# Patient Record
Sex: MALE | Race: WHITE | NOT HISPANIC OR LATINO
[De-identification: names, ages, dates, MRNs, and addresses within clinical notes are randomized per-mention and may not be internally consistent; named-entity substitution may affect disease eponyms.]

---

## 2018-07-29 ENCOUNTER — FORM ENCOUNTER (OUTPATIENT)
Age: 23
End: 2018-07-29

## 2018-07-30 ENCOUNTER — APPOINTMENT (OUTPATIENT)
Dept: ORTHOPEDIC SURGERY | Facility: CLINIC | Age: 23
End: 2018-07-30
Payer: COMMERCIAL

## 2018-07-30 ENCOUNTER — APPOINTMENT (OUTPATIENT)
Dept: ALLERGY | Facility: CLINIC | Age: 23
End: 2018-07-30
Payer: COMMERCIAL

## 2018-07-30 ENCOUNTER — OUTPATIENT (OUTPATIENT)
Dept: OUTPATIENT SERVICES | Facility: HOSPITAL | Age: 23
LOS: 1 days | End: 2018-07-30
Payer: COMMERCIAL

## 2018-07-30 ENCOUNTER — APPOINTMENT (OUTPATIENT)
Dept: MRI IMAGING | Facility: CLINIC | Age: 23
End: 2018-07-30
Payer: COMMERCIAL

## 2018-07-30 VITALS
WEIGHT: 230 LBS | HEART RATE: 109 BPM | DIASTOLIC BLOOD PRESSURE: 84 MMHG | BODY MASS INDEX: 32.2 KG/M2 | SYSTOLIC BLOOD PRESSURE: 146 MMHG | HEIGHT: 71 IN

## 2018-07-30 VITALS
SYSTOLIC BLOOD PRESSURE: 140 MMHG | HEIGHT: 69 IN | DIASTOLIC BLOOD PRESSURE: 80 MMHG | RESPIRATION RATE: 14 BRPM | BODY MASS INDEX: 34.07 KG/M2 | HEART RATE: 80 BPM | WEIGHT: 230 LBS

## 2018-07-30 DIAGNOSIS — Z80.1 FAMILY HISTORY OF MALIGNANT NEOPLASM OF TRACHEA, BRONCHUS AND LUNG: ICD-10-CM

## 2018-07-30 DIAGNOSIS — Z78.9 OTHER SPECIFIED HEALTH STATUS: ICD-10-CM

## 2018-07-30 DIAGNOSIS — M54.16 RADICULOPATHY, LUMBAR REGION: ICD-10-CM

## 2018-07-30 DIAGNOSIS — Z00.8 ENCOUNTER FOR OTHER GENERAL EXAMINATION: ICD-10-CM

## 2018-07-30 DIAGNOSIS — M54.5 LOW BACK PAIN: ICD-10-CM

## 2018-07-30 DIAGNOSIS — Z80.3 FAMILY HISTORY OF MALIGNANT NEOPLASM OF BREAST: ICD-10-CM

## 2018-07-30 PROCEDURE — 72100 X-RAY EXAM L-S SPINE 2/3 VWS: CPT

## 2018-07-30 PROCEDURE — 99204 OFFICE O/P NEW MOD 45 MIN: CPT

## 2018-07-30 PROCEDURE — 99204 OFFICE O/P NEW MOD 45 MIN: CPT | Mod: 25

## 2018-07-30 PROCEDURE — 72148 MRI LUMBAR SPINE W/O DYE: CPT

## 2018-07-30 PROCEDURE — 72148 MRI LUMBAR SPINE W/O DYE: CPT | Mod: 26

## 2018-07-30 PROCEDURE — 94060 EVALUATION OF WHEEZING: CPT

## 2018-07-30 RX ORDER — CETIRIZINE HCL 10 MG
TABLET ORAL
Refills: 0 | Status: ACTIVE | COMMUNITY

## 2018-07-30 RX ORDER — ALBUTEROL SULFATE 90 UG/1
AEROSOL, METERED RESPIRATORY (INHALATION)
Refills: 0 | Status: ACTIVE | COMMUNITY

## 2018-07-30 RX ORDER — FLUTICASONE PROPIONATE 50 MCG
50 SPRAY, SUSPENSION NASAL
Refills: 0 | Status: ACTIVE | COMMUNITY

## 2018-07-30 RX ORDER — FLUTICASONE FUROATE AND VILANTEROL TRIFENATATE 200; 25 UG/1; UG/1
200-25 POWDER RESPIRATORY (INHALATION)
Refills: 0 | Status: ACTIVE | COMMUNITY

## 2018-07-30 RX ORDER — NAPROXEN SODIUM 220 MG
TABLET ORAL
Refills: 0 | Status: ACTIVE | COMMUNITY

## 2018-08-07 ENCOUNTER — APPOINTMENT (OUTPATIENT)
Dept: ALLERGY | Facility: CLINIC | Age: 23
End: 2018-08-07
Payer: COMMERCIAL

## 2018-08-07 VITALS — WEIGHT: 230 LBS | BODY MASS INDEX: 34.07 KG/M2 | HEIGHT: 69 IN

## 2018-08-07 PROCEDURE — 95018 ALL TSTG PERQ&IQ DRUGS/BIOL: CPT

## 2018-08-07 PROCEDURE — 31231 NASAL ENDOSCOPY DX: CPT

## 2018-08-07 PROCEDURE — 95004 PERQ TESTS W/ALRGNC XTRCS: CPT

## 2018-08-07 PROCEDURE — 99213 OFFICE O/P EST LOW 20 MIN: CPT | Mod: 25

## 2018-08-08 ENCOUNTER — APPOINTMENT (OUTPATIENT)
Dept: ALLERGY | Facility: CLINIC | Age: 23
End: 2018-08-08
Payer: COMMERCIAL

## 2018-08-09 ENCOUNTER — TRANSCRIPTION ENCOUNTER (OUTPATIENT)
Age: 23
End: 2018-08-09

## 2018-08-10 ENCOUNTER — INPATIENT (INPATIENT)
Facility: HOSPITAL | Age: 23
LOS: 4 days | Discharge: HOME | DRG: 178 | End: 2018-08-15
Attending: INTERNAL MEDICINE | Admitting: INTERNAL MEDICINE
Payer: COMMERCIAL

## 2018-08-10 ENCOUNTER — APPOINTMENT (OUTPATIENT)
Dept: ORTHOPEDIC SURGERY | Facility: CLINIC | Age: 23
End: 2018-08-10

## 2018-08-10 VITALS
DIASTOLIC BLOOD PRESSURE: 80 MMHG | TEMPERATURE: 99 F | OXYGEN SATURATION: 95 % | SYSTOLIC BLOOD PRESSURE: 141 MMHG | HEART RATE: 94 BPM | RESPIRATION RATE: 20 BRPM

## 2018-08-10 DIAGNOSIS — J15.9 UNSPECIFIED BACTERIAL PNEUMONIA: ICD-10-CM

## 2018-08-10 DIAGNOSIS — R11.10 VOMITING, UNSPECIFIED: ICD-10-CM

## 2018-08-10 DIAGNOSIS — D50.9 IRON DEFICIENCY ANEMIA, UNSPECIFIED: ICD-10-CM

## 2018-08-10 DIAGNOSIS — J18.9 PNEUMONIA, UNSPECIFIED ORGANISM: ICD-10-CM

## 2018-08-10 LAB
ALBUMIN SERPL ELPH-MCNC: 4.5 G/DL — SIGNIFICANT CHANGE UP (ref 3.3–5)
ALP SERPL-CCNC: 63 U/L — SIGNIFICANT CHANGE UP (ref 40–120)
ALT FLD-CCNC: 20 U/L — SIGNIFICANT CHANGE UP (ref 10–45)
ANION GAP SERPL CALC-SCNC: 13 MMOL/L — SIGNIFICANT CHANGE UP (ref 5–17)
AST SERPL-CCNC: 15 U/L — SIGNIFICANT CHANGE UP (ref 10–40)
BASOPHILS # BLD AUTO: 0.1 K/UL — SIGNIFICANT CHANGE UP (ref 0–0.2)
BASOPHILS NFR BLD AUTO: 0.5 % — SIGNIFICANT CHANGE UP (ref 0–2)
BILIRUB SERPL-MCNC: 0.3 MG/DL — SIGNIFICANT CHANGE UP (ref 0.2–1.2)
BUN SERPL-MCNC: 10 MG/DL — SIGNIFICANT CHANGE UP (ref 7–23)
CALCIUM SERPL-MCNC: 9.4 MG/DL — SIGNIFICANT CHANGE UP (ref 8.4–10.5)
CHLORIDE SERPL-SCNC: 99 MMOL/L — SIGNIFICANT CHANGE UP (ref 96–108)
CO2 SERPL-SCNC: 25 MMOL/L — SIGNIFICANT CHANGE UP (ref 22–31)
CREAT SERPL-MCNC: 0.84 MG/DL — SIGNIFICANT CHANGE UP (ref 0.5–1.3)
EOSINOPHIL # BLD AUTO: 0.2 K/UL — SIGNIFICANT CHANGE UP (ref 0–0.5)
EOSINOPHIL NFR BLD AUTO: 1.7 % — SIGNIFICANT CHANGE UP (ref 0–6)
GAS PNL BLDV: SIGNIFICANT CHANGE UP
GLUCOSE SERPL-MCNC: 96 MG/DL — SIGNIFICANT CHANGE UP (ref 70–99)
HCT VFR BLD CALC: 33.8 % — LOW (ref 39–50)
HGB BLD-MCNC: 12.1 G/DL — LOW (ref 13–17)
LYMPHOCYTES # BLD AUTO: 2.9 K/UL — SIGNIFICANT CHANGE UP (ref 1–3.3)
LYMPHOCYTES # BLD AUTO: 25.2 % — SIGNIFICANT CHANGE UP (ref 13–44)
MCHC RBC-ENTMCNC: 27 PG — SIGNIFICANT CHANGE UP (ref 27–34)
MCHC RBC-ENTMCNC: 35.9 GM/DL — SIGNIFICANT CHANGE UP (ref 32–36)
MCV RBC AUTO: 75.2 FL — LOW (ref 80–100)
MONOCYTES # BLD AUTO: 0.8 K/UL — SIGNIFICANT CHANGE UP (ref 0–0.9)
MONOCYTES NFR BLD AUTO: 6.5 % — SIGNIFICANT CHANGE UP (ref 2–14)
NEUTROPHILS # BLD AUTO: 7.7 K/UL — HIGH (ref 1.8–7.4)
NEUTROPHILS NFR BLD AUTO: 66.1 % — SIGNIFICANT CHANGE UP (ref 43–77)
PLATELET # BLD AUTO: 518 K/UL — HIGH (ref 150–400)
POTASSIUM SERPL-MCNC: 3.5 MMOL/L — SIGNIFICANT CHANGE UP (ref 3.5–5.3)
POTASSIUM SERPL-SCNC: 3.5 MMOL/L — SIGNIFICANT CHANGE UP (ref 3.5–5.3)
PROT SERPL-MCNC: 8.6 G/DL — HIGH (ref 6–8.3)
RAPID RVP RESULT: SIGNIFICANT CHANGE UP
RBC # BLD: 4.49 M/UL — SIGNIFICANT CHANGE UP (ref 4.2–5.8)
RBC # FLD: 16.5 % — HIGH (ref 10.3–14.5)
SODIUM SERPL-SCNC: 137 MMOL/L — SIGNIFICANT CHANGE UP (ref 135–145)
WBC # BLD: 11.7 K/UL — HIGH (ref 3.8–10.5)
WBC # FLD AUTO: 11.7 K/UL — HIGH (ref 3.8–10.5)

## 2018-08-10 PROCEDURE — 71046 X-RAY EXAM CHEST 2 VIEWS: CPT | Mod: 26

## 2018-08-10 PROCEDURE — 99223 1ST HOSP IP/OBS HIGH 75: CPT

## 2018-08-10 PROCEDURE — 99285 EMERGENCY DEPT VISIT HI MDM: CPT

## 2018-08-10 PROCEDURE — 71250 CT THORAX DX C-: CPT | Mod: 26

## 2018-08-10 RX ORDER — LANOLIN ALCOHOL/MO/W.PET/CERES
3 CREAM (GRAM) TOPICAL ONCE
Qty: 0 | Refills: 0 | Status: COMPLETED | OUTPATIENT
Start: 2018-08-10 | End: 2018-08-10

## 2018-08-10 RX ORDER — IPRATROPIUM/ALBUTEROL SULFATE 18-103MCG
3 AEROSOL WITH ADAPTER (GRAM) INHALATION ONCE
Qty: 0 | Refills: 0 | Status: COMPLETED | OUTPATIENT
Start: 2018-08-10 | End: 2018-08-10

## 2018-08-10 RX ORDER — PANTOPRAZOLE SODIUM 20 MG/1
40 TABLET, DELAYED RELEASE ORAL ONCE
Qty: 0 | Refills: 0 | Status: COMPLETED | OUTPATIENT
Start: 2018-08-10 | End: 2018-08-11

## 2018-08-10 RX ORDER — SODIUM CHLORIDE 9 MG/ML
1000 INJECTION, SOLUTION INTRAVENOUS
Qty: 0 | Refills: 0 | Status: DISCONTINUED | OUTPATIENT
Start: 2018-08-10 | End: 2018-08-12

## 2018-08-10 RX ORDER — CEFTRIAXONE 500 MG/1
1 INJECTION, POWDER, FOR SOLUTION INTRAMUSCULAR; INTRAVENOUS EVERY 24 HOURS
Qty: 0 | Refills: 0 | Status: DISCONTINUED | OUTPATIENT
Start: 2018-08-11 | End: 2018-08-15

## 2018-08-10 RX ORDER — IPRATROPIUM/ALBUTEROL SULFATE 18-103MCG
3 AEROSOL WITH ADAPTER (GRAM) INHALATION EVERY 6 HOURS
Qty: 0 | Refills: 0 | Status: DISCONTINUED | OUTPATIENT
Start: 2018-08-10 | End: 2018-08-12

## 2018-08-10 RX ORDER — CEFTRIAXONE 500 MG/1
1 INJECTION, POWDER, FOR SOLUTION INTRAMUSCULAR; INTRAVENOUS ONCE
Qty: 0 | Refills: 0 | Status: COMPLETED | OUTPATIENT
Start: 2018-08-10 | End: 2018-08-10

## 2018-08-10 RX ORDER — ENOXAPARIN SODIUM 100 MG/ML
40 INJECTION SUBCUTANEOUS EVERY 24 HOURS
Qty: 0 | Refills: 0 | Status: DISCONTINUED | OUTPATIENT
Start: 2018-08-10 | End: 2018-08-15

## 2018-08-10 RX ORDER — ACETAMINOPHEN 500 MG
500 TABLET ORAL EVERY 8 HOURS
Qty: 0 | Refills: 0 | Status: DISCONTINUED | OUTPATIENT
Start: 2018-08-10 | End: 2018-08-15

## 2018-08-10 RX ORDER — METOCLOPRAMIDE HCL 10 MG
10 TABLET ORAL ONCE
Qty: 0 | Refills: 0 | Status: COMPLETED | OUTPATIENT
Start: 2018-08-10 | End: 2018-08-10

## 2018-08-10 RX ORDER — FAMOTIDINE 10 MG/ML
20 INJECTION INTRAVENOUS ONCE
Qty: 0 | Refills: 0 | Status: COMPLETED | OUTPATIENT
Start: 2018-08-10 | End: 2018-08-10

## 2018-08-10 RX ORDER — ONDANSETRON 8 MG/1
4 TABLET, FILM COATED ORAL ONCE
Qty: 0 | Refills: 0 | Status: COMPLETED | OUTPATIENT
Start: 2018-08-10 | End: 2018-08-10

## 2018-08-10 RX ORDER — ALBUTEROL 90 UG/1
2 AEROSOL, METERED ORAL
Qty: 0 | Refills: 0 | COMMUNITY

## 2018-08-10 RX ORDER — PANTOPRAZOLE SODIUM 20 MG/1
40 TABLET, DELAYED RELEASE ORAL
Qty: 0 | Refills: 0 | Status: DISCONTINUED | OUTPATIENT
Start: 2018-08-10 | End: 2018-08-10

## 2018-08-10 RX ORDER — PANTOPRAZOLE SODIUM 20 MG/1
40 TABLET, DELAYED RELEASE ORAL
Qty: 0 | Refills: 0 | Status: DISCONTINUED | OUTPATIENT
Start: 2018-08-10 | End: 2018-08-11

## 2018-08-10 RX ORDER — AZITHROMYCIN 500 MG/1
500 TABLET, FILM COATED ORAL EVERY 24 HOURS
Qty: 0 | Refills: 0 | Status: DISCONTINUED | OUTPATIENT
Start: 2018-08-11 | End: 2018-08-15

## 2018-08-10 RX ORDER — SODIUM CHLORIDE 9 MG/ML
1000 INJECTION INTRAMUSCULAR; INTRAVENOUS; SUBCUTANEOUS ONCE
Qty: 0 | Refills: 0 | Status: COMPLETED | OUTPATIENT
Start: 2018-08-10 | End: 2018-08-10

## 2018-08-10 RX ORDER — ONDANSETRON 8 MG/1
4 TABLET, FILM COATED ORAL EVERY 6 HOURS
Qty: 0 | Refills: 0 | Status: DISCONTINUED | OUTPATIENT
Start: 2018-08-10 | End: 2018-08-15

## 2018-08-10 RX ORDER — AZITHROMYCIN 500 MG/1
500 TABLET, FILM COATED ORAL ONCE
Qty: 0 | Refills: 0 | Status: COMPLETED | OUTPATIENT
Start: 2018-08-10 | End: 2018-08-10

## 2018-08-10 RX ADMIN — Medication 10 MILLIGRAM(S): at 19:20

## 2018-08-10 RX ADMIN — ONDANSETRON 4 MILLIGRAM(S): 8 TABLET, FILM COATED ORAL at 12:16

## 2018-08-10 RX ADMIN — ONDANSETRON 4 MILLIGRAM(S): 8 TABLET, FILM COATED ORAL at 21:56

## 2018-08-10 RX ADMIN — Medication 30 MILLILITER(S): at 12:16

## 2018-08-10 RX ADMIN — SODIUM CHLORIDE 1000 MILLILITER(S): 9 INJECTION INTRAMUSCULAR; INTRAVENOUS; SUBCUTANEOUS at 12:16

## 2018-08-10 RX ADMIN — SODIUM CHLORIDE 150 MILLILITER(S): 9 INJECTION, SOLUTION INTRAVENOUS at 19:21

## 2018-08-10 RX ADMIN — Medication 3 MILLIGRAM(S): at 23:19

## 2018-08-10 RX ADMIN — AZITHROMYCIN 250 MILLIGRAM(S): 500 TABLET, FILM COATED ORAL at 15:42

## 2018-08-10 RX ADMIN — Medication 3 MILLILITER(S): at 18:16

## 2018-08-10 RX ADMIN — CEFTRIAXONE 100 GRAM(S): 500 INJECTION, POWDER, FOR SOLUTION INTRAMUSCULAR; INTRAVENOUS at 12:16

## 2018-08-10 RX ADMIN — ONDANSETRON 4 MILLIGRAM(S): 8 TABLET, FILM COATED ORAL at 16:43

## 2018-08-10 RX ADMIN — FAMOTIDINE 20 MILLIGRAM(S): 10 INJECTION INTRAVENOUS at 12:16

## 2018-08-10 RX ADMIN — Medication 10 MILLIGRAM(S): at 13:00

## 2018-08-10 RX ADMIN — Medication 3 MILLILITER(S): at 12:16

## 2018-08-10 NOTE — CONSULT NOTE ADULT - SUBJECTIVE AND OBJECTIVE BOX
Patient is a 23y old  Male who presents with a chief complaint of cough, shortness of breath, nausea, emesis, fevers    HPI:  23M with no known PMH, some chronic low back pain for which he had been taking alleve for several months with development of diarrhea, nausea. Currently awaiting work up for possible gastritis with EGD. Now with fevers (up to 101 at home) and productive cough with green sputum production for 1 week. Denies sick contacts, however did travel by plane to attend a music festival recently. Was seen by PMD and prescribed levaquin for suspected PNA. Pt reports taking levaquin for 3 days on Tue/Wed/Thurs but developed worsening nausea and emesis and stopped. This morning pt reported increased SOB with wheeze and cough. Presents to ER for evaluation.     Allergies: No Known Allergies      MEDICATIONS  (STANDING):  ALBUTerol/ipratropium for Nebulization 3 milliLiter(s) Nebulizer every 6 hours  enoxaparin Injectable 40 milliGRAM(s) SubCutaneous every 24 hours  lactated ringers. 1000 milliLiter(s) (150 mL/Hr) IV Continuous <Continuous>  pantoprazole    Tablet 40 milliGRAM(s) Oral before breakfast    MEDICATIONS  (PRN):  ondansetron Injectable 4 milliGRAM(s) IV Push every 6 hours PRN Nausea and/or Vomiting      PAST MEDICAL & SURGICAL HISTORY:  No pertinent past medical history  No significant past surgical history      FAMILY HISTORY:  no contributory     SOCIAL HISTORY:  non smoker  no alcohol intake due to GI upset      REVIEW OF SYSTEMS:    CONSTITUTIONAL: + fevers, no sweats, no myalgias or arthralgias   EYES: No eye pain, no visual disturbances  ENMT:  No difficulty hearing, no tinnitus, denies sinus pain, denies throat pain  NECK: No pain or stiffness  RESPIRATORY: + productive cough with green sputum , + SOB + wheeze, no hemoptysis   CARDIOVASCULAR: No chest pain, no palpitations  GASTROINTESTINAL: + intermittent diarrhea, + nausea + non bloody emesis  GENITOURINARY: No dysuria, no frequency  NEUROLOGICAL: No headaches, no loss of strength, no numbness  SKIN: No itching, burning, rashes, or lesions   ENDOCRINE: No heat or cold intolerance  MUSCULOSKELETAL: No joint pain or swelling; chronic  back pain        ICU Vital Signs Last 24 Hrs  T(C): 37.4 (10 Aug 2018 11:30), Max: 37.4 (10 Aug 2018 10:36)  T(F): 99.3 (10 Aug 2018 11:30), Max: 99.3 (10 Aug 2018 10:36)  HR: 86 (10 Aug 2018 16:17) (86 - 94)  BP: 112/71 (10 Aug 2018 16:17) (112/71 - 141/80)  RR: 18 (10 Aug 2018 16:17) (18 - 20)  SpO2: 96% (10 Aug 2018 16:17) (95% - 97%)      PHYSICAL EXAM:    GENERAL: NAD, well-groomed, well-developed  HEAD:  Atraumatic, Normocephalic  EYES: EOMI, PERRLA, conjunctiva and sclera clear  ENMT: No tonsillar erythema, no exudates; Moist mucous membranes  NECK: Supple, No JVD, Normal thyroid  NERVOUS SYSTEM:  Alert & Oriented X3, Good concentration; Motor Strength 5/5 B/L upper and lower extremities  CHEST/LUNG: bilateral air entry, no rales, scattered rhonchi left lung field along with intermittent scattered wheeze  HEART: Regular rate and rhythm; No murmurs  ABDOMEN: Soft, Nontender, Nondistended; Bowel sounds present  EXTREMITIES:  2+ Peripheral Pulses, No clubbing, cyanosis, or edema  SKIN: mild erythema noted around anterior and posterior neck and along right lateral neck, no complaints of pruritis     LABS:  CBC Full  -  ( 10 Aug 2018 12:04 )  WBC Count : 11.7 K/uL  Hemoglobin : 12.1 g/dL  Hematocrit : 33.8 %  Platelet Count - Automated : 518 K/uL  Mean Cell Volume : 75.2 fl  Mean Cell Hemoglobin : 27.0 pg  Mean Cell Hemoglobin Concentration : 35.9 gm/dL  Auto Neutrophil # : 7.7 K/uL  Auto Lymphocyte # : 2.9 K/uL  Auto Monocyte # : 0.8 K/uL  Auto Eosinophil # : 0.2 K/uL  Auto Basophil # : 0.1 K/uL  Auto Neutrophil % : 66.1 %  Auto Lymphocyte % : 25.2 %  Auto Monocyte % : 6.5 %  Auto Eosinophil % : 1.7 %  Auto Basophil % : 0.5 %    08-10    137  |  99  |  10  ----------------------------<  96  3.5   |  25  |  0.84    Ca    9.4      10 Aug 2018 12:04    TPro  8.6<H>  /  Alb  4.5  /  TBili  0.3  / AST  15  /  ALT  20  /  AlkPhos  63  08-10      RADIOLOGY:  CXR < from: Xray Chest 2 Views PA/Lat (08.10.18 @ 13:32) >  The cardiomediastinal silhouette is unremarkable.  The right lung is clear.  Scattered, focal atelectatic changes and increased interstitial markings   seen in mid and lower left lung of uncertain etiology.    CT chest < from: CT Chest No Cont (08.10.18 @ 14:22) >  Ground glass and tree-in-bud opacity of left upper lobe compatible with   pneumonia.

## 2018-08-10 NOTE — ED PROVIDER NOTE - MEDICAL DECISION MAKING DETAILS
22yo male with pneumonia not responding to Levaquin, failure to tolerate PO with benign abdomen, will tx with ceftriaxone/azithromycin to cover community-acquired pneumonia, Gi cocktail/zofran, IV fluids, admission. Lilliam Head DO 22yo male with pneumonia not responding to Levaquin, failure to tolerate PO with benign abdomen, will tx with ceftriaxone/azithromycin to cover community-acquired pneumonia, Gi cocktail/zofran, IV fluids, admission. Lilliam Tom MD: Pt is a 22 y/o male with no sig PMH who is sent in for admission for failed outpt tx of PNA. Pt p/w cough, fever tmax 101F, shortness of breath x 1 week. Diagnosed with pneumonia 3 days ago on CXR and started on Levaquin but unable to tolerate medication due to n/v. Patient went to OSH yesterday because he was having wheezing and given duoneb treatment. Patient saw PMD today and told to go to ED for admission and IV antibiotics. Plan: CXR, basic labs, bcx, IV abx, antiemetics, admission

## 2018-08-10 NOTE — H&P ADULT - PROBLEM SELECTOR PLAN 1
- monitor fever curve, Tylenol PRN if febrile  - f/u BCx sent by ED; order sputum Cx, RVP, Legionella antigen  - continue IV Abx w/ Ceftriaxone and Azithromycin with plan for conversion to PO if tolerating; ID recommendations appreciated: if patient remains afebrile, symptomatically improved, consider d/c as soon as tomorrow- can give AM doses of CTX and Azithro with Ceftin 500 mg PO BID to start on 8/12 for an additional 2 days  - SOB resolved; will continue PRN Duonebs; pulmonology recommendations appreciated - monitor fever curve, Tylenol PRN if febrile  - f/u BCx sent by ED; order sputum Cx, RVP, Legionella antigen  - continue IV Abx w/ Ceftriaxone and Azithromycin with plan for conversion to PO if tolerating; ID recommendations appreciated: if patient remains afebrile, symptomatically improved, consider d/c as soon as tomorrow- can give AM doses of CTX and Azithro with Ceftin 500 mg PO BID to start on 8/12 for an additional 2 days  - SOB resolved; will continue PRN Duonebs; pulmonology recommendations appreciated  - cardiology recommendations appreciated

## 2018-08-10 NOTE — H&P ADULT - NEGATIVE NEUROLOGICAL SYMPTOMS
no confusion/no syncope/no headache/no loss of consciousness/no transient paralysis/no vertigo/no loss of sensation/no difficulty walking

## 2018-08-10 NOTE — ED ADULT NURSE REASSESSMENT NOTE - NS ED NURSE REASSESS COMMENT FT1
Received report from ED RN Carol; patient A&Ox3, reports nausea- medicated with reglan as per MD order; VSS, 20 g IV in R AC patent and site WNL. Patient admitted and waiting for bed assignment. Family at the bedside.

## 2018-08-10 NOTE — CONSULT NOTE ADULT - SUBJECTIVE AND OBJECTIVE BOX
CARDIOLOGY CONSULT - Dr. Mathew     CHIEF COMPLAINT: pneumonia     HPI: 23 year old male with no significant PMHx presenting with complaints of worsening pneumonia. As per patient symptoms started last friday with fever, chills, and cough. He was diagnosed with pneumonia on Tuesday and prescribed levoquin. The symptoms gradually got worse with decreased appetite, vomiting, increased difficulty breathing and overall weak feelings. Pt. also states he has been having diarrhea, nausea intermittently since January Pt. was scheduled to get outpt. endoscopy today to r/o gastritis. Pt. currently resting comfortably, denies cp/sob/cook, denies orthopnea, lower extremity edema.       PAST MEDICAL & SURGICAL HISTORY:  No pertinent past medical history  No significant past surgical history          PREVIOUS DIAGNOSTIC TESTING:    [ ] Echocardiogram:   [ ]  Catheterization:   [ ] Stress Test:  	    MEDICATIONS:  MEDICATIONS  (STANDING):  azithromycin  IVPB 500 milliGRAM(s) IV Intermittent Once  LORazepam   Injectable 1 milliGRAM(s) IV Push Once      FAMILY HISTORY:      SOCIAL HISTORY:    [x] Non-smoker  [ ] Smoker  [ ] Alcohol    Allergies    No Known Allergies    Intolerances    	    REVIEW OF SYSTEMS:  CONSTITUTIONAL: No fever, weight loss,+ fatigue  EYES: No eye pain, visual disturbances, or discharge  ENMT:  No difficulty hearing, tinnitus, vertigo; No sinus or throat pain  NECK: No pain or stiffness  RESPIRATORY: +cough, wheezing, chills no hemoptysis; +Shortness of Breath  CARDIOVASCULAR: No chest pain, palpitations, passing out, dizziness, or leg swelling  GASTROINTESTINAL: No abdominal or epigastric pain. +nausea, vomiting, no hematemesis; +diarrhea no constipation. No melena or hematochezia  GENITOURINARY: No dysuria, frequency, hematuria, or incontinence  NEUROLOGICAL: No headaches, memory loss, loss of strength, numbness, or tremors  SKIN: No itching, burning, rashes, or lesions   	    [x] All others negative	  [ ] Unable to obtain    PHYSICAL EXAM:  T(C): 37.4 (08-10-18 @ 11:30), Max: 37.4 (08-10-18 @ 10:36)  HR: 92 (08-10-18 @ 11:30) (92 - 94)  BP: 137/89 (08-10-18 @ 11:30) (137/89 - 141/80)  RR: 20 (08-10-18 @ 11:30) (20 - 20)  SpO2: 97% (08-10-18 @ 11:30) (95% - 97%)  Wt(kg): --  I&O's Summary      Appearance: Normal	  Psychiatry: A & O x 3, Mood & affect appropriate  HEENT:   Normal oral mucosa, PERRL, EOMI	  Lymphatic: No lymphadenopathy  Cardiovascular: Normal S1 S2,RRR, No JVD, No murmurs  Respiratory: +exp wheeze   Gastrointestinal:  Soft, Non-tender, + BS	  Skin: No rashes, No ecchymoses, No cyanosis	  Neurologic: Non-focal  Extremities: Normal range of motion, No clubbing, cyanosis or edema  Vascular: Peripheral pulses palpable 2+ bilaterally    TELEMETRY: 	    ECG:  	  RADIOLOGY:   < from: Xray Chest 2 Views PA/Lat (08.10.18 @ 13:32) >  IMPRESSION:    1. Right lung is clear.  2. Left lung changes, as described above, which may be secondary to mild   infective process - although no focal left-sided infiltrate is seen.  This report sent to ER via PACs system.    < end of copied text >  OTHER: 	  	  LABS:	 	    CARDIAC MARKERS:                                  12.1   11.7  )-----------( 518      ( 10 Aug 2018 12:04 )             33.8     08-10    137  |  99  |  10  ----------------------------<  96  3.5   |  25  |  0.84    Ca    9.4      10 Aug 2018 12:04    TPro  8.6<H>  /  Alb  4.5  /  TBili  0.3  /  DBili  x   /  AST  15  /  ALT  20  /  AlkPhos  63  08-10      proBNP:   Lipid Profile:   HgA1c:   TSH: CARDIOLOGY CONSULT - Dr. Mathew     CHIEF COMPLAINT: pneumonia     HPI: 23 year old male with no significant PMHx presenting with complaints of worsening pneumonia. As per patient symptoms started last friday with fever, chills, and cough. He was diagnosed with pneumonia on Tuesday and prescribed levoquin. The symptoms gradually got worse with decreased appetite, vomiting, increased difficulty breathing and overall weak feelings. Pt. also states he has been having diarrhea, nausea intermittently since January Pt. was scheduled to get outpt. endoscopy today to r/o gastritis. Pt. currently resting comfortably, denies cp/sob/cook, denies orthopnea, lower extremity edema.       PAST MEDICAL & SURGICAL HISTORY:  No pertinent past medical history  No significant past surgical history          PREVIOUS DIAGNOSTIC TESTING:    [ ] Echocardiogram:   [ ]  Catheterization:   [ ] Stress Test:  	    MEDICATIONS:  MEDICATIONS  (STANDING):  azithromycin  IVPB 500 milliGRAM(s) IV Intermittent Once  LORazepam   Injectable 1 milliGRAM(s) IV Push Once      FAMILY HISTORY:      SOCIAL HISTORY:    [x] Non-smoker  [ ] Smoker  [ ] Alcohol    Allergies    No Known Allergies    Intolerances    	    REVIEW OF SYSTEMS:  CONSTITUTIONAL: No fever, weight loss,+ fatigue  EYES: No eye pain, visual disturbances, or discharge  ENMT:  No difficulty hearing, tinnitus, vertigo; No sinus or throat pain  NECK: No pain or stiffness  RESPIRATORY: +cough, wheezing, chills no hemoptysis; +Shortness of Breath  CARDIOVASCULAR: No chest pain, palpitations, passing out, dizziness, or leg swelling  GASTROINTESTINAL: No abdominal or epigastric pain. +nausea, vomiting, no hematemesis; +diarrhea no constipation. No melena or hematochezia  GENITOURINARY: No dysuria, frequency, hematuria, or incontinence  NEUROLOGICAL: No headaches, memory loss, loss of strength, numbness, or tremors  SKIN: No itching, burning, rashes, or lesions   	    [x] All others negative	  [ ] Unable to obtain    PHYSICAL EXAM:  T(C): 37.4 (08-10-18 @ 11:30), Max: 37.4 (08-10-18 @ 10:36)  HR: 92 (08-10-18 @ 11:30) (92 - 94)  BP: 137/89 (08-10-18 @ 11:30) (137/89 - 141/80)  RR: 20 (08-10-18 @ 11:30) (20 - 20)  SpO2: 97% (08-10-18 @ 11:30) (95% - 97%)  Wt(kg): --  I&O's Summary      Appearance: Normal	  Psychiatry: A & O x 3, Mood & affect appropriate  HEENT:   Normal oral mucosa, PERRL, EOMI	  Lymphatic: No lymphadenopathy  Cardiovascular: Normal S1 S2,RRR, No JVD, No murmurs  Respiratory: +exp wheeze   Gastrointestinal:  Soft, Non-tender, + BS	  Skin: No rashes, No ecchymoses, No cyanosis	  Neurologic: Non-focal  Extremities: Normal range of motion, No clubbing, cyanosis or edema  Vascular: Peripheral pulses palpable 2+ bilaterally    TELEMETRY: 	    ECG: NSR 86  	  RADIOLOGY:   < from: Xray Chest 2 Views PA/Lat (08.10.18 @ 13:32) >  IMPRESSION:    1. Right lung is clear.  2. Left lung changes, as described above, which may be secondary to mild   infective process - although no focal left-sided infiltrate is seen.  This report sent to ER via PACs system.    < end of copied text >  OTHER: 	  	  LABS:	 	    CARDIAC MARKERS:                                  12.1   11.7  )-----------( 518      ( 10 Aug 2018 12:04 )             33.8     08-10    137  |  99  |  10  ----------------------------<  96  3.5   |  25  |  0.84    Ca    9.4      10 Aug 2018 12:04    TPro  8.6<H>  /  Alb  4.5  /  TBili  0.3  /  DBili  x   /  AST  15  /  ALT  20  /  AlkPhos  63  08-10      proBNP:   Lipid Profile:   HgA1c:   TSH:

## 2018-08-10 NOTE — ED PROVIDER NOTE - PHYSICAL EXAMINATION
Gen: NAD, coughing, uncomfortable appearing  Head: NCAT  Lung: diffuse expiratory wheezing throughout all lung fields with diminished breath sounds left upper lung field  CV: normal s1/s2, rrr, no murmurs, Normal perfusion, pulses 2+ throughout  Abd: soft, NTND  MSK: No edema, no visible deformities, full range of motion in all 4 extremities  Neuro: No focal neurologic deficits  Skin: No rash   Psych: normal affect

## 2018-08-10 NOTE — H&P ADULT - ASSESSMENT
23yoM w/ no known PMHx except for recurrent low back pain for which patient had been taking daily NSAIDs for symptomatic relief over the course of many months, but discontinued recently currently awaiting further w/u for gastritis per outpatient GI, who presents after being sent in by PMD to whom he presented today with non-resolving/worsening symptoms of cough, SOB/wheezing, despite initiation of PO antibiotics with Levaquin 3days ago for PITER pneumonia and ED visit at [place] with prescribed nebs/steroids, in addition to fevers to Tmax of 101 last night, persistent nausea and NB but bilious vomiting a/w abdominal pain.

## 2018-08-10 NOTE — H&P ADULT - NSHPSOCIALHISTORY_GEN_ALL_CORE
Patient is able to participate in all ADLs/IADLs. Nonsmoker, recent discontinuation of EtOH use given "intolerance since last year," no reported illicit drug use.

## 2018-08-10 NOTE — CONSULT NOTE ADULT - ATTENDING COMMENTS
Patient seen and examined.  Agree with above NP note.  23 year old man admitted with progressive dyspnea, biliious vomitting  on abx for 1 week for presumed pna  s/p ct chest  iv abx for pna  nebulizers for some comp of bronchospasm  check echo, r/o cmp in setting of infection  hydrate  ppi  gi eval   pulmo eval   abd benign  dvt ppx

## 2018-08-10 NOTE — ED CLERICAL - NS ED CLERK NOTE PRE-ARRIVAL INFORMATION; ADDITIONAL PRE-ARRIVAL INFORMATION
94, needs iv antibiotics, left hilar pneumonia, wheezing & sob on antibiotics, call md when pt is evaluated

## 2018-08-10 NOTE — ED PROVIDER NOTE - OBJECTIVE STATEMENT
24yo male no PMH presenting with cough, fever tmax 101F, shortness of breath x 1 week. Diagnosed with pneumonia 3 days ago and started on Levaquin but unable to tolerate medication by mouth. Patient went to OSH yesterday because he was having wheezing and given duoneb treatment. Patient saw PMD today and told to go to ED for admission and IV antibiotics.     PMD: Cain August

## 2018-08-10 NOTE — CONSULT NOTE ADULT - ASSESSMENT
23 year old male presents to ER with intolerance to PO abx regimen for CAP - severe nausea and vomiting likely due to PO Levaquin   Reported history of heavy NSAID use for chronic low back pain. suspect component of Gastritis /GERD    PLAN  -PPI  -IV fluid until able to tolerate PO  -no role for EGD at this time in the setting of acute PNA, pt can f/u with outpt. primary GI to reschedule once pneumonia resolved  -Zofran prn  -Abx as per primary team  -PO diet as tolerated    Discussed with pt and family at bedside, all questions answered    Thank you for the courtesy of this consult.  Toan Mata PA-C    [facility]  [phone]

## 2018-08-10 NOTE — H&P ADULT - HISTORY OF PRESENT ILLNESS
23yoM w/ no known PMHx except for recurrent low back pain for which patient had been taking daily NSAIDs for symptomatic relief over the course of many months, but discontinued recently currently awaiting further w/u for gastritis per outpatient GI, who presents after being sent in by PMD to whom he presented today with non-resolving/worsening symptoms of cough, SOB/wheezing, despite initiation of PO antibiotics with Levaquin 3days ago for PITER pneumonia and ED visit at [place] with prescribed nebs/steroids, in addition to fevers to Tmax of 101 last night, persistent nausea and NB but bilious vomiting a/w abdominal pain. Patient endorses recent travel to [facility] in [place] where he was working, with symptom onset thereafter. He denies HA, vision changes, neck stiffness, CP, palpitations, diarrhea/constipation, LE edema/pain, or have urinary complaints.     ED Presenting Vitals: 99.3F, 94bpm, 141/80, 20br/m, 95% on RA  ED Course: s/p IV Ceftriaxone 1g x1, IV Azithromycin 500mg x1, NS-1L, Duonebs 3mL x1, Maalox 30mL x1, Famotidine 20mg IVP x1, Zofran 4mg IVP x1, Reglan 10mg IVP x1, and Ativan 1mg IV x1; cardiology, gastroenterology, infectious disease, and pulmonolgy called by [facility]

## 2018-08-10 NOTE — ED ADULT NURSE NOTE - OBJECTIVE STATEMENT
22 yo M A&O x 3 came in with a dx PNA c/o sob, productive cough, vomiting, fever. pt states, " I had shortness of breath x 1 week. Diagnosed with pneumonia 3 days ago and started on Levaquin but unable to tolerate medication by mouth. I went to the hospital yesterday because I was having wheezing and a fever of 101 and given Duoneb treatment. I saw PMD today and was told to go to ED for admission and IV antibiotics. My sputum is dark yellow/green." Pt has been vomiting since Wednesday, has been having diarrhea since January for stomach issues he has been dealing with. Pt has expiratory wheezing throughout all lung fields with diminished breath sounds left upper lung field. Pt denies headache, dizziness, chest pain, palpitations, abdominal pain, urinary symptoms, chills, weakness at this time. Skin intact and normal for ethnicity. Pt resting comfortably in bed with family at bedside, side rails up, call bell in reach, bed at lowest position. Will continue to monitor.

## 2018-08-10 NOTE — H&P ADULT - NSHPLABSRESULTS_GEN_ALL_CORE
Labs and imaging obtained personally reviewed. Pertinent findings include:  - leukocytosis = 11.7  - microcytic anemia w/ H&H = 12.1/33.8  - thrombocytosis = 518  - WNL CMP  - CXR as reported: 1. Right lung is clear. 2. Left lung changes, as described above, which may be secondary to mild infective process - although no focal left-sided infiltrate is seen.   - CT Chest as reported: Groundglass and tree-in-bud opacity of left upper lobe compatible with pneumonia.

## 2018-08-10 NOTE — CONSULT NOTE ADULT - ASSESSMENT
24 yo M, possible GERD, here with 1 wk of cough and fever; onset of vomiting with 3 days of Levaquin.  Afebrile here, WBC minimally elevated, diff normal.  No sepsis criteria.  CT confirms L pneumonia.  Would continue to approach as CAP.  Could even consider viral process, michael with GI Sxs.  CTX + Azithro given here in ED.    Plan:  Will order same CTX + Azithro directed at [facility]  Check RVP  If remains afebrile, symptomatically improved, consider d/c as soon as tomorrow- can give AM doses of CTX and Azithro with Ceftin 500 mg po BID to start 8/12 for an additional 2 days  D/w pt and family- meaning of CAP reviewed; empiric nature of Tx emphasized

## 2018-08-10 NOTE — ED ADULT TRIAGE NOTE - CHIEF COMPLAINT QUOTE
dx pna c/o sob vomiting  fever sent in by pmd dx pna c/o sob vomiting  fever sent in by pmd  pt has multiple complaints

## 2018-08-10 NOTE — H&P ADULT - NSHPPHYSICALEXAM_GEN_ALL_CORE
Vital Signs Last 24 Hrs  T(F): 99.3 (10 Aug 2018 11:30), Max: 99.3 (10 Aug 2018 10:36)  HR: 86 (10 Aug 2018 16:17) (86 - 94)  BP: 112/71 (10 Aug 2018 16:17) (112/71 - 141/80)  RR: 18 (10 Aug 2018 16:17) (18 - 20)  SpO2: 96% (10 Aug 2018 16:17) (95% - 97%)    GEN: young man, sitting up in stretcher, in NAD  PSYCH: A&Ox3, mood and affect appear appropriate   SKIN: intact, no e/o rash  NEURO: no focal neurologic deficits appreciated; CN II-XII intact, sensation intact B/L, motor 5/5 in all mm groups  EYES: PERRL, anicteric, EOMI, no vertical/horizontal nystagmus  HEAD: NC, AT  NECK: supple  RESPI: no accessory muscle use, B/L air entry, course breath sounds  CARDIO: regular rate/rhythm, no LE edema B/L  ABD: soft, NT, ND, +BS  EXT: patient able to move all extremities spontaneously  VASC: peripheral pulses palpated

## 2018-08-10 NOTE — CONSULT NOTE ADULT - ASSESSMENT
23 year old male with no significant PMHx presenting with pneumonia.     1. Pneumonia   Cxr revealing left lung changes, may be secondary to mild infective process  CT chest pending results  continue pt. on ABX  Recommend starting duonebs q 6h   echo to evaluate LV function/valvular disease   pending Pulm eval     2. Nausea/vomiting/diarrhea  pt. with ? gastritis   recommend gentle IVF   antiemetics PRN   GI eval     DVT ppx 23 year old male with no significant PMHx presenting with pneumonia.     1. Pneumonia   Cxr revealing left lung changes, may be secondary to mild infective process  CT chest pending results  cv stable no chest pain or sob, no evidence of acute ischemia/ACS   continue pt. on ABX  Recommend starting duonebs q 6h   echo to evaluate LV function/valvular disease   pending Pulm eval     2. Nausea/vomiting/diarrhea  pt. with ? gastritis   recommend gentle IVF   antiemetics PRN   GI eval     DVT ppx 23 year old male with no significant PMHx presenting with pneumonia.     1. Pneumonia   Cxr revealing left lung changes, may be secondary to mild infective process  CT chest pending results  cv stable no chest pain, no evidence of acute ischemia/ACS   continue pt. on ABX  Recommend starting duonebs q 6h   echo to evaluate LV function/valvular disease   pending Pulm eval     2. Dyspnea, hypoxia  secondary to PNA  check echo, r/o cmp    3. Nausea/vomiting/diarrhea  pt. with ? gastritis, hx of heavy daily nsaid use   IVF   antiemetics PRN   PPI  GI eval     DVT ppx

## 2018-08-10 NOTE — H&P ADULT - NEGATIVE ENMT SYMPTOMS
no post-nasal discharge/no sinus symptoms/no throat pain/no dysphagia/no nasal discharge/no nasal congestion/no tinnitus

## 2018-08-10 NOTE — ED ADULT NURSE NOTE - NSIMPLEMENTINTERV_GEN_ALL_ED
Implemented All Universal Safety Interventions:  [place] to call system. Call bell, personal items and telephone within reach. Instruct patient to call for assistance. Room bathroom lighting operational. Non-slip footwear when patient is off stretcher. Physically safe environment: no spills, clutter or unnecessary equipment. Stretcher in lowest position, wheels locked, appropriate side rails in place.

## 2018-08-10 NOTE — H&P ADULT - PROBLEM SELECTOR PLAN 3
- likely in setting of suspected gastritis given chronic NSAID use  - no reported signs/sx of acute bleeding at this time and pt hemodynamically stable  - initiate PPI per GI  - per GI, no indication for inpatient endoscopy; will recommend outpatient f/u

## 2018-08-10 NOTE — H&P ADULT - NEGATIVE OPHTHALMOLOGIC SYMPTOMS
no pain R/no loss of vision L/no blurred vision R/no pain L/no blurred vision L/no loss of vision R/no diplopia/no photophobia

## 2018-08-10 NOTE — CONSULT NOTE ADULT - SUBJECTIVE AND OBJECTIVE BOX
HPI:   Patient is a 23y male with ?GERD, NSAID use for back pain, otherwise healthy, nonsmoker, well until 1 wk ago, first noted cough, fever, but still able to work.  Cough productive, but no assoc pleuritic pain.  With persistent cough and fever, seen 3 days ago, CXR c/w pneumonia, and Levaquin started.  He admits to ongoing N and V with now 3 doses of Levaquin- mult episodes of vomiting this AM.  T still elevated >101 as of yest as well.  No prior pneumonia, lung dz.  No sick contacts.    REVIEW OF SYSTEMS:  All other review of systems negative (Comprehensive ROS)    PAST MEDICAL & SURGICAL HISTORY:  No pertinent past medical history  No significant past surgical history      Allergies  No Known Allergies    Antimicrobials Day # 4     Other Medications:  ALBUTerol/ipratropium for Nebulization 3 milliLiter(s) Nebulizer every 6 hours  enoxaparin Injectable 40 milliGRAM(s) SubCutaneous every 24 hours  lactated ringers. 1000 milliLiter(s) IV Continuous <Continuous>  ondansetron Injectable 4 milliGRAM(s) IV Push every 6 hours PRN  pantoprazole    Tablet 40 milliGRAM(s) Oral before breakfast      FAMILY HISTORY:  NC    SOCIAL HISTORY:  Smoking: no  ETOH: no    Drug Use: denies  Single- has fiance      T(F): 99.3 (08-10-18 @ 11:30), Max: 99.3 (08-10-18 @ 10:36)  HR: 92 (08-10-18 @ 11:30)  BP: 137/89 (08-10-18 @ 11:30)  RR: 20 (08-10-18 @ 11:30)  SpO2: 97% (08-10-18 @ 11:30)  Wt(kg): --    PHYSICAL EXAM:  General: alert, no acute distress  Eyes:  anicteric, no conjunctival injection, no discharge  Oropharynx: no lesions or injection 	  Neck: supple, without adenopathy  Lungs: coarse BSs L, rub  Heart: regular rate and rhythm; no murmur, rubs or gallops  Abdomen: soft, nondistended, nontender, without mass or organomegaly  Skin: no lesions  Extremities: no clubbing, cyanosis, or edema  Neurologic: alert, oriented, moves all extremities    LAB RESULTS:                        12.1   11.7  )-----------( 518      ( 10 Aug 2018 12:04 )             33.8   Normal diff    08-10    137  |  99  |  10  ----------------------------<  96  3.5   |  25  |  0.84    Ca    9.4      10 Aug 2018 12:04    TPro  8.6<H>  /  Alb  4.5  /  TBili  0.3  /  DBili  x   /  AST  15  /  ALT  20  /  AlkPhos  63  08-10    LIVER FUNCTIONS - ( 10 Aug 2018 12:04 )  Alb: 4.5 g/dL / Pro: 8.6 g/dL / ALK PHOS: 63 U/L / ALT: 20 U/L / AST: 15 U/L / GGT: x           MICROBIOLOGY:  RECENT CULTURES:  Pending      RADIOLOGY REVIEWED:  CT Chest No Cont (08.10.18 @ 14:22) >  Groundglass and tree-in-bud opacity of left upper lobe compatible with   pneumonia.

## 2018-08-10 NOTE — CONSULT NOTE ADULT - ASSESSMENT
23M with no significant PMH presents for 1 week of productive cough, SOB, fevers associated with nausea and emesis. Here with left upper lobe pneumonia.    - currently given azithromycin and ceftriaxone to treat for community acquired pneumonia  - monitor fever trend, afebrile in ER  - would check sputum culture  - this could also be viral in etiology, thus would recommend checking a respiratory viral panel  - Duonebs q6 hours as needed for SOB or wheeze  - oxygenating well on RA at present time  - antiemetics for underlying nausea

## 2018-08-10 NOTE — CONSULT NOTE ADULT - SUBJECTIVE AND OBJECTIVE BOX
Patient is a 23y old  Male who presents with a chief complaint of nausea/vomiting    HPI:  23 year old male with no significant PMHx presenting with complaints of worsening pneumonia. As per patient symptoms started last friday with fever, chills, and cough. He was diagnosed with pneumonia on Tuesday and prescribed levaquin. The symptoms gradually got worse with decreased appetite, vomiting (reports gastric and bilious emesis ~approximately 4 cups worth), increased difficulty breathing and overall weakness. Pt. also states he has been having diarrhea, nausea intermittently since January - last BM small and formed brown stool yesterday. He admits to excessive use of PO Alleve since January due to low back pain - was advised by outpt. GI to discontinue Aleve  He was scheduled to get outpt. endoscopy today (primary GI at [facility]) to r/o gastritis. Pt. currently resting comfortably, denies cp/sob/cook, denies orthopnea, lower extremity edema.     In Er - received Zofran and IVF and started on IV antibiotics - currently feeling "much better"  No history of ileus or SBO no prior abdominal surgery    PAST MEDICAL & SURGICAL HISTORY:  low back pain, herniated discs    No significant past surgical history    Allergies  No Known Allergies      MEDICATIONS  (STANDING):  ALBUTerol/ipratropium for Nebulization 3 milliLiter(s) Nebulizer every 6 hours  azithromycin  IVPB 500 milliGRAM(s) IV Intermittent Once  LORazepam   Injectable 1 milliGRAM(s) IV Push Once    MEDICATIONS  (PRN):      Social History:  Lives with family    no illicit drug use, tobacco +denies ETOH abuse    Family History   IBD (  ) Yes   (X ) No  GI Malignancy (  )  Yes    ( X ) No    Health Management  Last Colonoscopy - none      Advanced Directives: (  X   ) None    (      ) DNR    (     ) DNI    (     ) Health Care Proxy:     Review of Systems:    General:  No wt loss, fevers, chills, night sweats, fatigue,   CV:  No pain, palpitations, hypo/hypertension  Resp:  +pneumonia  GI:  see HPI  :  No pain, bleeding, incontinence, nocturia  Muscle:  chronic low back pain  Neuro:  No weakness, tingling, memory problems  Psych:  No fatigue, insomnia, mood problems, depression  Endocrine:  No polyuria, polydypsia, cold/heat intolerance  Heme:  No petechiae, ecchymosis, easy bruisability  Skin:  No rash, tattoos, scars, edema      Vital Signs Last 24 Hrs  T(C): 37.4 (10 Aug 2018 11:30), Max: 37.4 (10 Aug 2018 10:36)  T(F): 99.3 (10 Aug 2018 11:30), Max: 99.3 (10 Aug 2018 10:36)  HR: 92 (10 Aug 2018 11:30) (92 - 94)  BP: 137/89 (10 Aug 2018 11:30) (137/89 - 141/80)  BP(mean): --  RR: 20 (10 Aug 2018 11:30) (20 - 20)  SpO2: 97% (10 Aug 2018 11:30) (95% - 97%)    PHYSICAL EXAM:    Constitutional: NAD, well-developed smiling and pleasant  Neck: No LAD, supple trachea mdiline  Respiratory: grossly clear  Cardiovascular: S1 and S2, RRR, no M  Gastrointestinal: BS+, soft, NT/ND, neg HSM,  Extremities: No peripheral edema, neg clubbing, cyanosis  Vascular: 2+ peripheral pulses  Neurological: A/O x 3, no focal deficits  Psychiatric: Normal mood, normal affect  Skin: No rashes, good  turgor        LABS:                        12.1   11.7  )-----------( 518      ( 10 Aug 2018 12:04 )             33.8     08-10    137  |  99  |  10  ----------------------------<  96  3.5   |  25  |  0.84    Ca    9.4      10 Aug 2018 12:04    TPro  8.6<H>  /  Alb  4.5  /  TBili  0.3  /  DBili  x   /  AST  15  /  ALT  20  /  AlkPhos  63  08-10      RADIOLOGY & ADDITIONAL TESTS:  < from: Xray Chest 2 Views PA/Lat (08.10.18 @ 13:32) >  INTERPRETATION:  DATE OF STUDY: 8/10/18.    COMPARISON: None.    CLINICAL INDICATION: 23-yo-male patient with shortness of breath.    TECHNIQUE: PA and lateral chest films.     FINDINGS:   The cardiomediastinal silhouette is unremarkable.  The right lung is clear.  Scattered, focal atelectatic changes and increased interstitial markings   seen in mid and lower left lung of uncertain etiology.  No left lung focal infiltrate is noted.  No pleural effusion.  No pneumothorax.  The bony structures are intact.    IMPRESSION:    1. Right lung is clear.  2. Left lung changes, as described above, which may be secondary to mild   infective process - although no focal left-sided infiltrate is seen.  This report sent to ER via PACs system. Patient is a 23y old  Male who presents with a chief complaint of nausea/vomiting    HPI:  23 year old male with no significant PMHx presenting with complaints of worsening pneumonia. As per patient symptoms started last friday with fever, chills, and cough. He was diagnosed with pneumonia on Tuesday and prescribed levaquin. The symptoms gradually got worse with decreased appetite, vomiting (reports gastric and bilious emesis ~approximately 4 cups worth), increased difficulty breathing and overall weakness. Pt. also states he has been having diarrhea, nausea intermittently since January - last BM small and formed brown stool yesterday. He admits to excessive use of PO Alleve since January due to low back pain - was advised by outpt. GI to discontinue Aleve  He was scheduled to get outpt. endoscopy today (primary GI at [place]) to r/o gastritis. Pt. currently resting comfortably, denies cp/sob/cook, denies orthopnea, lower extremity edema.     In Er - received doses of Zofran, Reglan &Pepcid  and IVF and started on IV antibiotics - currently feeling "much better"  No history of ileus or SBO no prior abdominal surgery    PAST MEDICAL & SURGICAL HISTORY:  low back pain, herniated discs    No significant past surgical history    Allergies  No Known Allergies      MEDICATIONS  (STANDING):  ALBUTerol/ipratropium for Nebulization 3 milliLiter(s) Nebulizer every 6 hours  azithromycin  IVPB 500 milliGRAM(s) IV Intermittent Once  LORazepam   Injectable 1 milliGRAM(s) IV Push Once    MEDICATIONS  (PRN):      Social History:  Lives with family    no illicit drug use, tobacco +denies ETOH abuse    Family History   IBD (  ) Yes   (X ) No  GI Malignancy (  )  Yes    ( X ) No    Health Management  Last Colonoscopy - none      Advanced Directives: (  X   ) None    (      ) DNR    (     ) DNI    (     ) Health Care Proxy:     Review of Systems:    General:  No wt loss, fevers, chills, night sweats, fatigue,   CV:  No pain, palpitations, hypo/hypertension  Resp:  +pneumonia  GI:  see HPI  :  No pain, bleeding, incontinence, nocturia  Muscle:  chronic low back pain  Neuro:  No weakness, tingling, memory problems  Psych:  No fatigue, insomnia, mood problems, depression  Endocrine:  No polyuria, polydypsia, cold/heat intolerance  Heme:  No petechiae, ecchymosis, easy bruisability  Skin:  No rash, tattoos, scars, edema      Vital Signs Last 24 Hrs  T(C): 37.4 (10 Aug 2018 11:30), Max: 37.4 (10 Aug 2018 10:36)  T(F): 99.3 (10 Aug 2018 11:30), Max: 99.3 (10 Aug 2018 10:36)  HR: 92 (10 Aug 2018 11:30) (92 - 94)  BP: 137/89 (10 Aug 2018 11:30) (137/89 - 141/80)  BP(mean): --  RR: 20 (10 Aug 2018 11:30) (20 - 20)  SpO2: 97% (10 Aug 2018 11:30) (95% - 97%)    PHYSICAL EXAM:    Constitutional: NAD, well-developed smiling and pleasant  Neck: No LAD, supple trachea mdiline  Respiratory: grossly clear  Cardiovascular: S1 and S2, RRR, no M  Gastrointestinal: BS+, soft, NT/ND, neg HSM,  Extremities: No peripheral edema, neg clubbing, cyanosis  Vascular: 2+ peripheral pulses  Neurological: A/O x 3, no focal deficits  Psychiatric: Normal mood, normal affect  Skin: No rashes, good  turgor        LABS:                        12.1   11.7  )-----------( 518      ( 10 Aug 2018 12:04 )             33.8     08-10    137  |  99  |  10  ----------------------------<  96  3.5   |  25  |  0.84    Ca    9.4      10 Aug 2018 12:04    TPro  8.6<H>  /  Alb  4.5  /  TBili  0.3  /  DBili  x   /  AST  15  /  ALT  20  /  AlkPhos  63  08-10      RADIOLOGY & ADDITIONAL TESTS:  < from: Xray Chest 2 Views PA/Lat (08.10.18 @ 13:32) >  INTERPRETATION:  DATE OF STUDY: 8/10/18.    COMPARISON: None.    CLINICAL INDICATION: 23-yo-male patient with shortness of breath.    TECHNIQUE: PA and lateral chest films.     FINDINGS:   The cardiomediastinal silhouette is unremarkable.  The right lung is clear.  Scattered, focal atelectatic changes and increased interstitial markings   seen in mid and lower left lung of uncertain etiology.  No left lung focal infiltrate is noted.  No pleural effusion.  No pneumothorax.  The bony structures are intact.    IMPRESSION:    1. Right lung is clear.  2. Left lung changes, as described above, which may be secondary to mild   infective process - although no focal left-sided infiltrate is seen.  This report sent to ER via PACs system.

## 2018-08-11 LAB
GRAM STN FLD: SIGNIFICANT CHANGE UP
LEGIONELLA AG UR QL: NEGATIVE — SIGNIFICANT CHANGE UP
SPECIMEN SOURCE: SIGNIFICANT CHANGE UP

## 2018-08-11 RX ORDER — ACETAMINOPHEN 500 MG
650 TABLET ORAL EVERY 6 HOURS
Qty: 0 | Refills: 0 | Status: DISCONTINUED | OUTPATIENT
Start: 2018-08-11 | End: 2018-08-15

## 2018-08-11 RX ORDER — IBUPROFEN 200 MG
400 TABLET ORAL ONCE
Qty: 0 | Refills: 0 | Status: COMPLETED | OUTPATIENT
Start: 2018-08-11 | End: 2018-08-11

## 2018-08-11 RX ORDER — LEVALBUTEROL 1.25 MG/.5ML
0.63 SOLUTION, CONCENTRATE RESPIRATORY (INHALATION) ONCE
Qty: 0 | Refills: 0 | Status: COMPLETED | OUTPATIENT
Start: 2018-08-11 | End: 2018-08-11

## 2018-08-11 RX ORDER — PANTOPRAZOLE SODIUM 20 MG/1
40 TABLET, DELAYED RELEASE ORAL
Qty: 0 | Refills: 0 | Status: DISCONTINUED | OUTPATIENT
Start: 2018-08-11 | End: 2018-08-15

## 2018-08-11 RX ORDER — SUCRALFATE 1 G
1 TABLET ORAL
Qty: 0 | Refills: 0 | Status: DISCONTINUED | OUTPATIENT
Start: 2018-08-11 | End: 2018-08-15

## 2018-08-11 RX ADMIN — Medication 1 GRAM(S): at 16:53

## 2018-08-11 RX ADMIN — ONDANSETRON 4 MILLIGRAM(S): 8 TABLET, FILM COATED ORAL at 16:01

## 2018-08-11 RX ADMIN — ONDANSETRON 4 MILLIGRAM(S): 8 TABLET, FILM COATED ORAL at 23:03

## 2018-08-11 RX ADMIN — Medication 400 MILLIGRAM(S): at 21:28

## 2018-08-11 RX ADMIN — SODIUM CHLORIDE 150 MILLILITER(S): 9 INJECTION, SOLUTION INTRAVENOUS at 23:04

## 2018-08-11 RX ADMIN — Medication 1 GRAM(S): at 23:02

## 2018-08-11 RX ADMIN — Medication 650 MILLIGRAM(S): at 18:40

## 2018-08-11 RX ADMIN — Medication 3 MILLILITER(S): at 23:02

## 2018-08-11 RX ADMIN — PANTOPRAZOLE SODIUM 40 MILLIGRAM(S): 20 TABLET, DELAYED RELEASE ORAL at 00:09

## 2018-08-11 RX ADMIN — Medication 650 MILLIGRAM(S): at 13:01

## 2018-08-11 RX ADMIN — Medication 650 MILLIGRAM(S): at 17:50

## 2018-08-11 RX ADMIN — LEVALBUTEROL 0.63 MILLIGRAM(S): 1.25 SOLUTION, CONCENTRATE RESPIRATORY (INHALATION) at 07:42

## 2018-08-11 RX ADMIN — CEFTRIAXONE 100 GRAM(S): 500 INJECTION, POWDER, FOR SOLUTION INTRAMUSCULAR; INTRAVENOUS at 17:50

## 2018-08-11 RX ADMIN — AZITHROMYCIN 250 MILLIGRAM(S): 500 TABLET, FILM COATED ORAL at 12:48

## 2018-08-11 RX ADMIN — Medication 3 MILLILITER(S): at 16:53

## 2018-08-11 RX ADMIN — Medication 400 MILLIGRAM(S): at 20:03

## 2018-08-11 RX ADMIN — PANTOPRAZOLE SODIUM 40 MILLIGRAM(S): 20 TABLET, DELAYED RELEASE ORAL at 06:32

## 2018-08-11 RX ADMIN — SODIUM CHLORIDE 150 MILLILITER(S): 9 INJECTION, SOLUTION INTRAVENOUS at 12:02

## 2018-08-11 RX ADMIN — SODIUM CHLORIDE 150 MILLILITER(S): 9 INJECTION, SOLUTION INTRAVENOUS at 00:09

## 2018-08-11 RX ADMIN — ONDANSETRON 4 MILLIGRAM(S): 8 TABLET, FILM COATED ORAL at 08:10

## 2018-08-11 RX ADMIN — Medication 650 MILLIGRAM(S): at 12:01

## 2018-08-11 RX ADMIN — PANTOPRAZOLE SODIUM 40 MILLIGRAM(S): 20 TABLET, DELAYED RELEASE ORAL at 16:52

## 2018-08-11 RX ADMIN — Medication 200 MILLIGRAM(S): at 12:01

## 2018-08-11 NOTE — PROGRESS NOTE ADULT - SUBJECTIVE AND OBJECTIVE BOX
CC: f/u for  cap  Patient reports  still wheezes but less vomiting  REVIEW OF SYSTEMS:  All other review of systems negative (Comprehensive ROS)    Antimicrobials Day #  :5/7  azithromycin  IVPB 500 milliGRAM(s) IV Intermittent every 24 hours  cefTRIAXone   IVPB 1 Gram(s) IV Intermittent every 24 hours    Other Medications Reviewed    T(F): 98.4 (08-11-18 @ 16:36), Max: 98.6 (08-10-18 @ 21:48)  HR: 64 (08-11-18 @ 16:36)  BP: 111/69 (08-11-18 @ 16:36)  RR: 19 (08-11-18 @ 16:36)  SpO2: 95% (08-11-18 @ 16:36)  Wt(kg): --    PHYSICAL EXAM:  General: alert, no acute distress  Eyes:  anicteric, no conjunctival injection, no discharge  Oropharynx: no lesions or injection 	  Neck: supple, without adenopathy  Lungs: wheeze to auscultation  Heart: regular rate and rhythm; no murmur, rubs or gallops  Abdomen: soft, nondistended, nontender, without mass or organomegaly  Skin: no lesions  Extremities: no clubbing, cyanosis, or edema  Neurologic: alert, oriented, moves all extremities    LAB RESULTS:                        12.1   11.7  )-----------( 518      ( 10 Aug 2018 12:04 )             33.8     08-10    137  |  99  |  10  ----------------------------<  96  3.5   |  25  |  0.84    Ca    9.4      10 Aug 2018 12:04    TPro  8.6<H>  /  Alb  4.5  /  TBili  0.3  /  DBili  x   /  AST  15  /  ALT  20  /  AlkPhos  63  08-10    LIVER FUNCTIONS - ( 10 Aug 2018 12:04 )  Alb: 4.5 g/dL / Pro: 8.6 g/dL / ALK PHOS: 63 U/L / ALT: 20 U/L / AST: 15 U/L / GGT: x             MICROBIOLOGY:  RECENT CULTURES:  08-10 @ 13:32 .Blood Blood     No growth to date.          RADIOLOGY REVIEWED:    < from: CT Chest No Cont (08.10.18 @ 14:22) >    Groundglass and tree-in-bud opacity of left upper lobe compatible with   pneumonia.      < end of copied text >    Assessment:  Patient with cap and reactive airways. Vomiting resolved with stopping levaquin  Plan: 2 more days of ctx. zithro  pulmonary follow up for wheeze  nausea per gi  await sputum cx

## 2018-08-11 NOTE — PROGRESS NOTE ADULT - ASSESSMENT
23 year old male with no significant PMHx presenting with pneumonia.     1. Pneumonia   Cxr revealing left lung changes, may be secondary to mild infective process  CT chest w PITER PNA  cv stable no chest pain, no evidence of acute ischemia/ACS   continue pt. on ABX  Nebs prn   echo to evaluate LV function/valvular disease   Pulm follow up     2. Dyspnea, hypoxia  secondary to PNA  check echo, r/o cmp    3. Nausea/vomiting/diarrhea  pt. with ? gastritis, hx of heavy daily nsaid use   IVF   antiemetics PRN   PPI  GI eval     DVT ppx

## 2018-08-11 NOTE — PROGRESS NOTE ADULT - ASSESSMENT
22 yo male with PNA on abx noted to have nausea and vomiting.  Appears to be improving.  ?contribution from GERD, gastritis.     1) Will increase protonix to bid  2) Add carafate  3) No role for EGD at this time in the setting of PNA.  If symptoms persist, would consider output EGD.

## 2018-08-11 NOTE — PROGRESS NOTE ADULT - SUBJECTIVE AND OBJECTIVE BOX
INTERVAL HPI / OVERNIGHT EVENTS:  Pt reports that nausea and vomiting are improving.    MEDICATIONS  (STANDING):  ALBUTerol/ipratropium for Nebulization 3 milliLiter(s) Nebulizer every 6 hours  azithromycin  IVPB 500 milliGRAM(s) IV Intermittent every 24 hours  cefTRIAXone   IVPB 1 Gram(s) IV Intermittent every 24 hours  enoxaparin Injectable 40 milliGRAM(s) SubCutaneous every 24 hours  lactated ringers. 1000 milliLiter(s) (150 mL/Hr) IV Continuous <Continuous>  pantoprazole    Tablet 40 milliGRAM(s) Oral two times a day  sucralfate suspension 1 Gram(s) Oral four times a day    MEDICATIONS  (PRN):  acetaminophen   Tablet 500 milliGRAM(s) Oral every 8 hours PRN For Temp greater than 38.5 C (101.3 F)  acetaminophen   Tablet. 650 milliGRAM(s) Oral every 6 hours PRN Moderate Pain (4 - 6)  ALBUTerol/ipratropium for Nebulization 3 milliLiter(s) Nebulizer every 6 hours PRN Shortness of Breath and/or Wheezing  guaiFENesin    Syrup 200 milliGRAM(s) Oral every 6 hours PRN Cough  ondansetron Injectable 4 milliGRAM(s) IV Push every 6 hours PRN Nausea and/or Vomiting      Allergies    No Known Allergies    Intolerances      Review of Systems:    General:  No wt loss, fevers, chills, night sweats, fatigue  ENT:  No sore throat, pain, runny nose, dysphagia  CV:  No chest pain, palpitations, PAYTON  Resp:  No dyspnea, cough, tachypnea, wheezing  Neuro:  No focal weakness, headache, vision changes  Heme:  No petechiae, ecchymosis, easy bruisability      Vital Signs Last 24 Hrs  T(C): 36.8 (11 Aug 2018 07:29), Max: 37.1 (10 Aug 2018 19:25)  T(F): 98.3 (11 Aug 2018 07:29), Max: 98.8 (10 Aug 2018 19:25)  HR: 77 (11 Aug 2018 07:29) (77 - 90)  BP: 126/74 (11 Aug 2018 07:29) (112/71 - 131/79)  BP(mean): --  RR: 18 (11 Aug 2018 07:29) (18 - 18)  SpO2: 95% (11 Aug 2018 07:29) (95% - 97%)    PHYSICAL EXAM:    Constitutional: NAD, well-developed  Neck: No LAD, supple  Respiratory: clear to auscultation b/l no rales, rhonchi, wheezing  Cardiovascular: S1 and S2, RRR, no murmur  Gastrointestinal: +BS x4, soft, NT/ND, neg HSM,  Extremities: No peripheral edema, neg clubbing, cyanosis  Vascular: 2+ peripheral pulses  Neurological: A/O x 3, no focal deficits  Psychiatric: Normal mood, normal affect  Skin: No rashes, anicteric      LABS:                        12.1   11.7  )-----------( 518      ( 10 Aug 2018 12:04 )             33.8     08-10    137  |  99  |  10  ----------------------------<  96  3.5   |  25  |  0.84    Ca    9.4      10 Aug 2018 12:04    TPro  8.6<H>  /  Alb  4.5  /  TBili  0.3  /  DBili  x   /  AST  15  /  ALT  20  /  AlkPhos  63  08-10        LIVER FUNCTIONS - ( 10 Aug 2018 12:04 )  Alb: 4.5 g/dL / Pro: 8.6 g/dL / ALK PHOS: 63 U/L / ALT: 20 U/L / AST: 15 U/L / GGT: x             RADIOLOGY & ADDITIONAL TESTS:

## 2018-08-11 NOTE — CHART NOTE - NSCHARTNOTEFT_GEN_A_CORE
Called by RN for pt c/o cough.   Pt seen at bedside + b/l wheezing, refusing to take duoneb, stating he had it in ED last night and caused him to vomit.   Explained to pt that it was likely the cough that made him vomit.   Pt and family refusing duoneb.   Xopenex ordered x 1   Hycodan 5mL x 1     f/u with pulm today  cont current meds     Nena Rose ANP-BC  [id number]

## 2018-08-11 NOTE — PROGRESS NOTE ADULT - SUBJECTIVE AND OBJECTIVE BOX
CC: c/o chest congestion, cough and occasional nausea    TELEMETRY:     PHYSICAL EXAM:    T(C): 36.8 (08-11-18 @ 07:29), Max: 37.4 (08-10-18 @ 10:36)  HR: 77 (08-11-18 @ 07:29) (77 - 94)  BP: 126/74 (08-11-18 @ 07:29) (112/71 - 141/80)  RR: 18 (08-11-18 @ 07:29) (18 - 20)  SpO2: 95% (08-11-18 @ 07:29) (95% - 97%)  Wt(kg): --  I&O's Summary    10 Aug 2018 07:01  -  11 Aug 2018 07:00  --------------------------------------------------------  IN: 1980 mL / OUT: 0 mL / NET: 1980 mL    11 Aug 2018 07:01  -  11 Aug 2018 09:01  --------------------------------------------------------  IN: 200 mL / OUT: 0 mL / NET: 200 mL        Appearance: Normal	  Cardiovascular: Normal S1 S2,RRR, No JVD, No murmurs  Respiratory: diffuse rhonchi w slight wheeze	  Gastrointestinal:  Soft, Non-tender, + BS	  Extremities: Normal range of motion, No clubbing, cyanosis or edema  Vascular: Peripheral pulses palpable 2+ bilaterally     LABS:	 	                          12.1   11.7  )-----------( 518      ( 10 Aug 2018 12:04 )             33.8     08-10    137  |  99  |  10  ----------------------------<  96  3.5   |  25  |  0.84    Ca    9.4      10 Aug 2018 12:04    TPro  8.6<H>  /  Alb  4.5  /  TBili  0.3  /  DBili  x   /  AST  15  /  ALT  20  /  AlkPhos  63  08-10          CARDIAC MARKERS:

## 2018-08-12 PROCEDURE — 71046 X-RAY EXAM CHEST 2 VIEWS: CPT | Mod: 26

## 2018-08-12 PROCEDURE — 99233 SBSQ HOSP IP/OBS HIGH 50: CPT | Mod: GC

## 2018-08-12 RX ORDER — ALBUTEROL 90 UG/1
2.5 AEROSOL, METERED ORAL EVERY 4 HOURS
Qty: 0 | Refills: 0 | Status: DISCONTINUED | OUTPATIENT
Start: 2018-08-12 | End: 2018-08-12

## 2018-08-12 RX ORDER — IBUPROFEN 200 MG
600 TABLET ORAL EVERY 8 HOURS
Qty: 0 | Refills: 0 | Status: DISCONTINUED | OUTPATIENT
Start: 2018-08-12 | End: 2018-08-15

## 2018-08-12 RX ORDER — LANOLIN ALCOHOL/MO/W.PET/CERES
5 CREAM (GRAM) TOPICAL ONCE
Qty: 0 | Refills: 0 | Status: COMPLETED | OUTPATIENT
Start: 2018-08-12 | End: 2018-08-12

## 2018-08-12 RX ORDER — ALBUTEROL 90 UG/1
2.5 AEROSOL, METERED ORAL EVERY 4 HOURS
Qty: 0 | Refills: 0 | Status: DISCONTINUED | OUTPATIENT
Start: 2018-08-12 | End: 2018-08-15

## 2018-08-12 RX ORDER — ALBUTEROL 90 UG/1
2.5 AEROSOL, METERED ORAL
Qty: 0 | Refills: 0 | Status: DISCONTINUED | OUTPATIENT
Start: 2018-08-12 | End: 2018-08-12

## 2018-08-12 RX ORDER — ALBUTEROL 90 UG/1
2.5 AEROSOL, METERED ORAL
Qty: 0 | Refills: 0 | Status: DISCONTINUED | OUTPATIENT
Start: 2018-08-12 | End: 2018-08-15

## 2018-08-12 RX ORDER — IBUPROFEN 200 MG
600 TABLET ORAL EVERY 8 HOURS
Qty: 0 | Refills: 0 | Status: DISCONTINUED | OUTPATIENT
Start: 2018-08-12 | End: 2018-08-12

## 2018-08-12 RX ADMIN — Medication 5 MILLIGRAM(S): at 22:35

## 2018-08-12 RX ADMIN — PANTOPRAZOLE SODIUM 40 MILLIGRAM(S): 20 TABLET, DELAYED RELEASE ORAL at 17:24

## 2018-08-12 RX ADMIN — AZITHROMYCIN 250 MILLIGRAM(S): 500 TABLET, FILM COATED ORAL at 11:32

## 2018-08-12 RX ADMIN — Medication 1 GRAM(S): at 05:48

## 2018-08-12 RX ADMIN — Medication 3 MILLILITER(S): at 05:49

## 2018-08-12 RX ADMIN — Medication 3 MILLILITER(S): at 11:31

## 2018-08-12 RX ADMIN — ONDANSETRON 4 MILLIGRAM(S): 8 TABLET, FILM COATED ORAL at 17:23

## 2018-08-12 RX ADMIN — CEFTRIAXONE 100 GRAM(S): 500 INJECTION, POWDER, FOR SOLUTION INTRAMUSCULAR; INTRAVENOUS at 17:22

## 2018-08-12 RX ADMIN — Medication 40 MILLIGRAM(S): at 17:25

## 2018-08-12 RX ADMIN — ALBUTEROL 2.5 MILLIGRAM(S): 90 AEROSOL, METERED ORAL at 21:25

## 2018-08-12 RX ADMIN — ONDANSETRON 4 MILLIGRAM(S): 8 TABLET, FILM COATED ORAL at 05:48

## 2018-08-12 RX ADMIN — ONDANSETRON 4 MILLIGRAM(S): 8 TABLET, FILM COATED ORAL at 11:41

## 2018-08-12 RX ADMIN — ALBUTEROL 2.5 MILLIGRAM(S): 90 AEROSOL, METERED ORAL at 17:23

## 2018-08-12 RX ADMIN — PANTOPRAZOLE SODIUM 40 MILLIGRAM(S): 20 TABLET, DELAYED RELEASE ORAL at 05:48

## 2018-08-12 NOTE — PROGRESS NOTE ADULT - ASSESSMENT
24 yo male with PNA on abx noted to have nausea and vomiting.  Appears to be improving.  ?contribution from GERD, gastritis.     1) continue protonix bid  2) continue carafate  3) No role for EGD at this time in the setting of PNA.  If symptoms persist, would consider output EGD.  4) he is already scheduled for an egd/col at [facility] by dr coronado, in late august  5) observe for bms, I would avoid laxatives, given history of 6-7 months of diarrhea

## 2018-08-12 NOTE — PROGRESS NOTE ADULT - SUBJECTIVE AND OBJECTIVE BOX
OBJECTIVE:  ICU Vital Signs Last 24 Hrs  T(C): 36.7 (12 Aug 2018 08:23), Max: 36.9 (11 Aug 2018 16:36)  T(F): 98.1 (12 Aug 2018 08:23), Max: 98.4 (11 Aug 2018 16:36)  HR: 61 (12 Aug 2018 08:23) (60 - 64)  BP: 104/65 (12 Aug 2018 08:23) (104/65 - 111/69)  BP(mean): --  ABP: --  ABP(mean): --  RR: 18 (12 Aug 2018 08:23) (18 - 19)  SpO2: 95% (12 Aug 2018 08:23) (95% - 97%)        08-11 @ 07:01  -  08-12 @ 07:00  --------------------------------------------------------  IN: 500 mL / OUT: 0 mL / NET: 500 mL        PHYSICAL EXAM:  General:   HEENT:   Respiratory:   Cardiovascular:   Abdomen:   Extremities:   Skin:   Neurological:    HOSPITAL MEDICATIONS:  enoxaparin Injectable 40 milliGRAM(s) SubCutaneous every 24 hours    azithromycin  IVPB 500 milliGRAM(s) IV Intermittent every 24 hours  cefTRIAXone   IVPB 1 Gram(s) IV Intermittent every 24 hours        ALBUTerol/ipratropium for Nebulization 3 milliLiter(s) Nebulizer every 6 hours PRN  ALBUTerol/ipratropium for Nebulization 3 milliLiter(s) Nebulizer every 6 hours  guaiFENesin    Syrup 200 milliGRAM(s) Oral every 6 hours PRN    acetaminophen   Tablet 500 milliGRAM(s) Oral every 8 hours PRN  acetaminophen   Tablet. 650 milliGRAM(s) Oral every 6 hours PRN  ibuprofen  Tablet 600 milliGRAM(s) Oral every 8 hours PRN  ondansetron Injectable 4 milliGRAM(s) IV Push every 6 hours PRN    pantoprazole    Tablet 40 milliGRAM(s) Oral two times a day  sucralfate suspension 1 Gram(s) Oral four times a day        LABS:  Hgb Trend: 12.1<--    Creatinine Trend: 0.84<--      MICROBIOLOGY:     RADIOLOGY:  [ ] Reviewed and interpreted by me    PULMONARY FUNCTION TESTS: Pt seen and examined by the bedside. No acute events overnight. Still wheezing and mildly SOB. Has productive cough, nausea. Cough brought on by deep inspiration. Denies fevers, chills, chest pain, abdominal pain, vomiting.     OBJECTIVE:  ICU Vital Signs Last 24 Hrs  T(C): 36.7 (12 Aug 2018 08:23), Max: 36.9 (11 Aug 2018 16:36)  T(F): 98.1 (12 Aug 2018 08:23), Max: 98.4 (11 Aug 2018 16:36)  HR: 61 (12 Aug 2018 08:23) (60 - 64)  BP: 104/65 (12 Aug 2018 08:23) (104/65 - 111/69)  BP(mean): --  ABP: --  ABP(mean): --  RR: 18 (12 Aug 2018 08:23) (18 - 19)  SpO2: 95% (12 Aug 2018 08:23) (95% - 97%)    08-11 @ 07:01  -  08-12 @ 07:00  --------------------------------------------------------  IN: 500 mL / OUT: 0 mL / NET: 500 mL    PHYSICAL EXAM:  General: NAD, pleasant  HEENT: NCAT, moist mucosal membranes  Respiratory: wheezing on expiration worse in midlung/bases  Cardiovascular: S1/S2 normal, RRR, no murmurs/rubs/gallops appreciated  Abdomen: soft, non-tender, non-distended with normal bowel sounds  Extremities: no b/l LE edema, no cyanosis/clubbing  Skin: warm/dry  Neurological: AAOx3, no focal deficits    HOSPITAL MEDICATIONS:  enoxaparin Injectable 40 milliGRAM(s) SubCutaneous every 24 hours    azithromycin  IVPB 500 milliGRAM(s) IV Intermittent every 24 hours  cefTRIAXone   IVPB 1 Gram(s) IV Intermittent every 24 hours    ALBUTerol/ipratropium for Nebulization 3 milliLiter(s) Nebulizer every 6 hours PRN  ALBUTerol/ipratropium for Nebulization 3 milliLiter(s) Nebulizer every 6 hours  guaiFENesin    Syrup 200 milliGRAM(s) Oral every 6 hours PRN    acetaminophen   Tablet 500 milliGRAM(s) Oral every 8 hours PRN  acetaminophen   Tablet. 650 milliGRAM(s) Oral every 6 hours PRN  ibuprofen  Tablet 600 milliGRAM(s) Oral every 8 hours PRN  ondansetron Injectable 4 milliGRAM(s) IV Push every 6 hours PRN    pantoprazole    Tablet 40 milliGRAM(s) Oral two times a day  sucralfate suspension 1 Gram(s) Oral four times a day      LABS:  Hgb Trend: 12.1<--    Creatinine Trend: 0.84<--    MICROBIOLOGY:   Sputum GNR, GPC pairs  BCx NGTD.     RADIOLOGY:  CT Chest PITER GGO/tree in bud    PULMONARY FUNCTION TESTS: Pt seen and examined by the bedside. No acute events overnight. Still wheezing and mildly SOB. Has productive cough, nausea. Cough brought on by deep inspiration. Denies fevers, chills, chest pain, abdominal pain, vomiting.     OBJECTIVE:  ICU Vital Signs Last 24 Hrs  T(C): 36.7 (12 Aug 2018 08:23), Max: 36.9 (11 Aug 2018 16:36)  T(F): 98.1 (12 Aug 2018 08:23), Max: 98.4 (11 Aug 2018 16:36)  HR: 61 (12 Aug 2018 08:23) (60 - 64)  BP: 104/65 (12 Aug 2018 08:23) (104/65 - 111/69)  BP(mean): --  ABP: --  ABP(mean): --  RR: 18 (12 Aug 2018 08:23) (18 - 19)  SpO2: 95% (12 Aug 2018 08:23) (95% - 97%)    08-11 @ 07:01  -  08-12 @ 07:00  --------------------------------------------------------  IN: 500 mL / OUT: 0 mL / NET: 500 mL    PHYSICAL EXAM:  General: NAD, pleasant  HEENT: NCAT, moist mucosal membranes  Respiratory: wheezing on expiration worse in midlung/bases  Cardiovascular: S1/S2 normal, RRR, no murmurs/rubs/gallops appreciated  Abdomen: soft, non-tender, non-distended with normal bowel sounds  Extremities: no b/l LE edema, no cyanosis/clubbing  Skin: warm/dry  Neurological: AAOx3, no focal deficits    HOSPITAL MEDICATIONS:  enoxaparin Injectable 40 milliGRAM(s) SubCutaneous every 24 hours    azithromycin  IVPB 500 milliGRAM(s) IV Intermittent every 24 hours  cefTRIAXone   IVPB 1 Gram(s) IV Intermittent every 24 hours    ALBUTerol/ipratropium for Nebulization 3 milliLiter(s) Nebulizer every 6 hours PRN  ALBUTerol/ipratropium for Nebulization 3 milliLiter(s) Nebulizer every 6 hours  guaiFENesin    Syrup 200 milliGRAM(s) Oral every 6 hours PRN    acetaminophen   Tablet 500 milliGRAM(s) Oral every 8 hours PRN  acetaminophen   Tablet. 650 milliGRAM(s) Oral every 6 hours PRN  ibuprofen  Tablet 600 milliGRAM(s) Oral every 8 hours PRN  ondansetron Injectable 4 milliGRAM(s) IV Push every 6 hours PRN    pantoprazole    Tablet 40 milliGRAM(s) Oral two times a day  sucralfate suspension 1 Gram(s) Oral four times a day      LABS:  Hgb Trend: 12.1<--    Creatinine Trend: 0.84<--    MICROBIOLOGY:   Sputum GNR, GPC pairs  BCx NGTD.     RADIOLOGY:  CT Chest PITER GGO/tree in bud    PULMONARY FUNCTION TESTS: None

## 2018-08-12 NOTE — PROGRESS NOTE ADULT - ATTENDING COMMENTS
pt seen and examine d    d/w the pulm team    will continue antibiotics for pneumonia    nebulizers q 4hrly---still has significant bronchospasm  will added steroids  DR YOUSIF TO F/U IN AM

## 2018-08-12 NOTE — PROGRESS NOTE ADULT - ASSESSMENT
23 year old male with no significant PMHx presenting with pneumonia.     1. Pneumonia -reactive airway  Cxr revealing left lung changes, may be secondary to mild infective process  CT chest w PITER PNA  cv stable no chest pain, no evidence of acute ischemia/ACS   continue pt. on ABX  will repeat CXR  Nebs prn   echo to evaluate LV function/valvular disease   Pulm follow up for further mgmt of wheezing    2. Dyspnea, hypoxia  secondary to PNA  check echo, r/o cmp    3. Nausea/vomiting/diarrhea-resolved  antiemetics PRN   PPI  GI eval     DVT ppx

## 2018-08-12 NOTE — PROGRESS NOTE ADULT - ASSESSMENT
----------------------------------------  Janki Lawler MD PGY-5  Pulmonary/Critical Care Fellow  Pager # [phone] ([facility]), [phone] ([facility]) 23M no significant PMHx, who p/w productive cough x1 wk, fevers, wheezing, SOB. CT Chest w/PITER GGO/tree-in-bud. RVP neg but sputum growing GNR and GPC cocci in pairs. On exam, pt has exp wheezing and coughs with deep inspiration. Saturating well on RA. Likely has underlying bacterial pneumonia with RAD vs. asthma exacerbation  - cont azithro/ceftriaxone to treat for GNR/GPC coverage  - f/u sputum Cx  - would start prednisone 40mg qd for now  - Albuterol q4h, q2h prn for SOB/wheezing  - will need full outpatient PFTs upon discharge  - antiemetics for underlying nausea     Case d/w Dr. Dey (pulm attending) and primary team    ----------------------------------------  Janki Lawler MD PGY-5  Pulmonary/Critical Care Fellow  Pager # [phone] ([facility]), [phone] ([facility])

## 2018-08-12 NOTE — PROGRESS NOTE ADULT - SUBJECTIVE AND OBJECTIVE BOX
INTERVAL HPI/OVERNIGHT EVENTS: overall better tolerating po without n/v, concerned about no bm    MEDICATIONS  (STANDING):  ALBUTerol    0.083% 2.5 milliGRAM(s) Nebulizer every 4 hours  azithromycin  IVPB 500 milliGRAM(s) IV Intermittent every 24 hours  cefTRIAXone   IVPB 1 Gram(s) IV Intermittent every 24 hours  enoxaparin Injectable 40 milliGRAM(s) SubCutaneous every 24 hours  pantoprazole    Tablet 40 milliGRAM(s) Oral two times a day  predniSONE   Tablet 40 milliGRAM(s) Oral daily  sucralfate suspension 1 Gram(s) Oral four times a day    MEDICATIONS  (PRN):  acetaminophen   Tablet 500 milliGRAM(s) Oral every 8 hours PRN For Temp greater than 38.5 C (101.3 F)  acetaminophen   Tablet. 650 milliGRAM(s) Oral every 6 hours PRN Moderate Pain (4 - 6)  ALBUTerol    0.083% 2.5 milliGRAM(s) Nebulizer every 2 hours PRN Shortness of Breath and/or Wheezing  guaiFENesin    Syrup 200 milliGRAM(s) Oral every 6 hours PRN Cough  ibuprofen  Tablet 600 milliGRAM(s) Oral every 8 hours PRN Severe pain  ondansetron Injectable 4 milliGRAM(s) IV Push every 6 hours PRN Nausea and/or Vomiting      Allergies    No Known Allergies    Intolerances        Review of Systems:    General:  No wt loss, fevers, chills, night sweats,fatigue,   ENT:  No sore throat, pain, runny nose, dysphagia  CV:  No pain, palpitatioins, hypo/hypertension  Resp:  No dyspnea, cough, tachypnea, wheezing  Neuro:  No weakness, tingling, memory problems  Heme:  No petechiae, ecchymosis, easy bruisability        Vital Signs Last 24 Hrs  T(C): 37.4 (12 Aug 2018 16:18), Max: 37.4 (12 Aug 2018 16:18)  T(F): 99.4 (12 Aug 2018 16:18), Max: 99.4 (12 Aug 2018 16:18)  HR: 71 (12 Aug 2018 16:18) (60 - 71)  BP: 114/64 (12 Aug 2018 16:18) (104/65 - 114/64)  BP(mean): --  RR: 19 (12 Aug 2018 16:18) (18 - 19)  SpO2: 95% (12 Aug 2018 16:18) (95% - 97%)    PHYSICAL EXAM:    Constitutional: NAD, well-developed  Neck: No LAD, supple  Respiratory: clear to auscultation b/l no rales, rhonchi, wheezing  Cardiovascular: S1 and S2, RRR, no murmur  Gastrointestinal: +BS x4, soft, NT/ND, neg HSM,  Extremities: No peripheral edema, neg clubbing, cyanosis  Vascular: 2+ peripheral pulses  Neurological: A/O x 3, no focal deficits  Psychiatric: Normal mood, normal affect  Skin: No rashes      LABS:              LIVER FUNCTIONS - ( 10 Aug 2018 12:04 )  Alb: 4.5 g/dL / Pro: 8.6 g/dL / ALK PHOS: 63 U/L / ALT: 20 U/L / AST: 15 U/L / GGT: x             RADIOLOGY & ADDITIONAL TESTS:

## 2018-08-12 NOTE — PROGRESS NOTE ADULT - SUBJECTIVE AND OBJECTIVE BOX
CC: still with wheezing, mildly improved w last neb tx, nausea resolved w cessation of levaquin, no other complaints    TELEMETRY:     PHYSICAL EXAM:    T(C): 36.8 (08-12-18 @ 02:39), Max: 36.9 (08-11-18 @ 16:36)  HR: 60 (08-12-18 @ 02:39) (60 - 64)  BP: 106/65 (08-12-18 @ 02:39) (106/65 - 111/69)  RR: 18 (08-12-18 @ 02:39) (18 - 19)  SpO2: 97% (08-12-18 @ 02:39) (95% - 97%)  Wt(kg): --  I&O's Summary    11 Aug 2018 07:01  -  12 Aug 2018 07:00  --------------------------------------------------------  IN: 500 mL / OUT: 0 mL / NET: 500 mL        Appearance: Normal	  Cardiovascular: Normal S1 S2,RRR, No JVD, No murmurs  Respiratory: Lungs clear to auscultation	  Gastrointestinal:  Soft, Non-tender, + BS	  Extremities: Normal range of motion, No clubbing, cyanosis or edema  Vascular: Peripheral pulses palpable 2+ bilaterally     LABS:	 	                          12.1   11.7  )-----------( 518      ( 10 Aug 2018 12:04 )             33.8     08-10    137  |  99  |  10  ----------------------------<  96  3.5   |  25  |  0.84    Ca    9.4      10 Aug 2018 12:04    TPro  8.6<H>  /  Alb  4.5  /  TBili  0.3  /  DBili  x   /  AST  15  /  ALT  20  /  AlkPhos  63  08-10          CARDIAC MARKERS:        MEDICATIONS  (STANDING):  ALBUTerol/ipratropium for Nebulization 3 milliLiter(s) Nebulizer every 6 hours  azithromycin  IVPB 500 milliGRAM(s) IV Intermittent every 24 hours  cefTRIAXone   IVPB 1 Gram(s) IV Intermittent every 24 hours  enoxaparin Injectable 40 milliGRAM(s) SubCutaneous every 24 hours  lactated ringers. 1000 milliLiter(s) (150 mL/Hr) IV Continuous <Continuous>  pantoprazole    Tablet 40 milliGRAM(s) Oral two times a day  sucralfate suspension 1 Gram(s) Oral four times a day

## 2018-08-13 DIAGNOSIS — R06.02 SHORTNESS OF BREATH: ICD-10-CM

## 2018-08-13 DIAGNOSIS — K29.70 GASTRITIS, UNSPECIFIED, WITHOUT BLEEDING: ICD-10-CM

## 2018-08-13 DIAGNOSIS — J18.9 PNEUMONIA, UNSPECIFIED ORGANISM: ICD-10-CM

## 2018-08-13 DIAGNOSIS — Z29.9 ENCOUNTER FOR PROPHYLACTIC MEASURES, UNSPECIFIED: ICD-10-CM

## 2018-08-13 DIAGNOSIS — J30.9 ALLERGIC RHINITIS, UNSPECIFIED: ICD-10-CM

## 2018-08-13 DIAGNOSIS — J98.01 ACUTE BRONCHOSPASM: ICD-10-CM

## 2018-08-13 LAB
CULTURE RESULTS: SIGNIFICANT CHANGE UP
SPECIMEN SOURCE: SIGNIFICANT CHANGE UP

## 2018-08-13 PROCEDURE — 99233 SBSQ HOSP IP/OBS HIGH 50: CPT

## 2018-08-13 RX ORDER — FLUTICASONE PROPIONATE 50 MCG
1 SPRAY, SUSPENSION NASAL
Qty: 0 | Refills: 0 | Status: DISCONTINUED | OUTPATIENT
Start: 2018-08-13 | End: 2018-08-15

## 2018-08-13 RX ORDER — LANOLIN ALCOHOL/MO/W.PET/CERES
5 CREAM (GRAM) TOPICAL ONCE
Qty: 0 | Refills: 0 | Status: COMPLETED | OUTPATIENT
Start: 2018-08-13 | End: 2018-08-13

## 2018-08-13 RX ORDER — POLYETHYLENE GLYCOL 3350 17 G/17G
17 POWDER, FOR SOLUTION ORAL DAILY
Qty: 0 | Refills: 0 | Status: DISCONTINUED | OUTPATIENT
Start: 2018-08-13 | End: 2018-08-15

## 2018-08-13 RX ORDER — BUDESONIDE AND FORMOTEROL FUMARATE DIHYDRATE 160; 4.5 UG/1; UG/1
2 AEROSOL RESPIRATORY (INHALATION)
Qty: 0 | Refills: 0 | Status: DISCONTINUED | OUTPATIENT
Start: 2018-08-13 | End: 2018-08-13

## 2018-08-13 RX ORDER — BUDESONIDE AND FORMOTEROL FUMARATE DIHYDRATE 160; 4.5 UG/1; UG/1
2 AEROSOL RESPIRATORY (INHALATION)
Qty: 0 | Refills: 0 | Status: DISCONTINUED | OUTPATIENT
Start: 2018-08-13 | End: 2018-08-15

## 2018-08-13 RX ADMIN — Medication 40 MILLIGRAM(S): at 05:25

## 2018-08-13 RX ADMIN — ALBUTEROL 2.5 MILLIGRAM(S): 90 AEROSOL, METERED ORAL at 05:25

## 2018-08-13 RX ADMIN — ALBUTEROL 2.5 MILLIGRAM(S): 90 AEROSOL, METERED ORAL at 11:20

## 2018-08-13 RX ADMIN — ALBUTEROL 2.5 MILLIGRAM(S): 90 AEROSOL, METERED ORAL at 17:44

## 2018-08-13 RX ADMIN — BUDESONIDE AND FORMOTEROL FUMARATE DIHYDRATE 2 PUFF(S): 160; 4.5 AEROSOL RESPIRATORY (INHALATION) at 18:03

## 2018-08-13 RX ADMIN — ALBUTEROL 2.5 MILLIGRAM(S): 90 AEROSOL, METERED ORAL at 21:18

## 2018-08-13 RX ADMIN — Medication 5 MILLIGRAM(S): at 22:22

## 2018-08-13 RX ADMIN — CEFTRIAXONE 100 GRAM(S): 500 INJECTION, POWDER, FOR SOLUTION INTRAMUSCULAR; INTRAVENOUS at 17:53

## 2018-08-13 RX ADMIN — PANTOPRAZOLE SODIUM 40 MILLIGRAM(S): 20 TABLET, DELAYED RELEASE ORAL at 18:03

## 2018-08-13 RX ADMIN — Medication 1 SPRAY(S): at 17:58

## 2018-08-13 RX ADMIN — PANTOPRAZOLE SODIUM 40 MILLIGRAM(S): 20 TABLET, DELAYED RELEASE ORAL at 05:26

## 2018-08-13 RX ADMIN — AZITHROMYCIN 250 MILLIGRAM(S): 500 TABLET, FILM COATED ORAL at 13:12

## 2018-08-13 NOTE — PROGRESS NOTE ADULT - ASSESSMENT
23M with no significant PMH presents for 1 week of productive cough, SOB, fevers associated with nausea and emesis. Here with left upper lobe pneumonia.    - currently given azithromycin and ceftriaxone to treat for community acquired pneumonia  - monitor fever trend, afebrile in ER  - would check sputum culture  - this could also be viral in etiology, thus would recommend checking a respiratory viral panel  - Duonebs q6 hours as needed for SOB or wheeze  - oxygenating well on RA at present time  - antiemetics for underlying nausea 23M with no significant PMH presents for 1 week of productive cough, SOB, fevers associated with nausea and emesis. Here with left upper lobe pneumonia.    - currently D4 azithromycin and ceftriaxone to treat for community acquired pneumonia  Asthmatic type bronchitis-on BD and prednisone 40 mg per day

## 2018-08-13 NOTE — PROGRESS NOTE ADULT - ASSESSMENT
22 yo male with PNA on abx noted to have nausea and vomiting.  Appears to be improving.  ?contribution from GERD, gastritis.     1) continue protonix bid  2) continue carafate  3) No role for EGD at this time in the setting of PNA.  If symptoms persist, would consider output EGD.  4) he is already scheduled for an egd/col at [place] by dr coronado, in late august  5) Will give 1 dose of miralax

## 2018-08-13 NOTE — PROGRESS NOTE ADULT - ATTENDING COMMENTS
Patient seen and examined.  Agree with above NP note.  clinically stable and improved   cont iv abx  nebz  antittussives  check echo, r/o cmp  gi sx resolved  tolerating po diet  d/c plan for [facility]

## 2018-08-13 NOTE — PROGRESS NOTE ADULT - SUBJECTIVE AND OBJECTIVE BOX
CC: f/u for pneumonia    Patient reports ; no additional n/v, cough improved, afebrile,still with a few wheezes, now on CTX/Azithromycin and steroids as well    REVIEW OF SYSTEMS:  All other review of systems negative (Comprehensive ROS)    Antimicrobials Day #  :day 7/7  azithromycin  IVPB 500 milliGRAM(s) IV Intermittent every 24 hours  cefTRIAXone   IVPB 1 Gram(s) IV Intermittent every 24 hours    Other Medications Reviewed    T(F): 98.2 (08-13-18 @ 08:01), Max: 99.4 (08-12-18 @ 16:18)  HR: 62 (08-13-18 @ 08:01)  BP: 130/79 (08-13-18 @ 08:01)  RR: 18 (08-13-18 @ 08:01)  SpO2: 97% (08-13-18 @ 08:01)  Wt(kg): --    PHYSICAL EXAM:  General: alert, no acute distress  Eyes:  anicteric, no conjunctival injection, no discharge  Oropharynx: no lesions or injection 	  Neck: supple, without adenopathy  Lungs: few expiratory wheezes  Heart: regular rate and rhythm; no murmur, rubs or gallops  Abdomen: soft, nondistended, nontender, without mass or organomegaly  Skin: no lesions  Extremities: no clubbing, cyanosis, or edema  Neurologic: alert, oriented, moves all extremities    LAB RESULTS:              MICROBIOLOGY:  RECENT CULTURES:  08-11 @ 17:38 .Sputum Sputum     Normal Respiratory Precious present    Few polymorphonuclear leukocytes per low power field  Few Squamous epithelial cells per low power field  Moderate Gram Negative Rods per oil power field  Few Gram positive cocci in pairs, chains and clusters per oil power field    08-10 @ 13:32 .Blood Blood     No growth to date.          RADIOLOGY REVIEWED:  < from: Xray Chest 2 Views PA/Lat (08.12.18 @ 13:42) >  INTERPRETATION:  DATE OF STUDY: 8/12/18.    COMPARISON: 8/10/18 plain chest; had chest CT scan done 8/10/18.    CLINICAL INDICATION: 23-yo-male patient withpneumonia - followup.    TECHNIQUE: PA and lateral chest films.     FINDINGS:   The cardiomediastinal silhouette is unremarkable.   Scattered left lung opacities (throughout left lung) consistent with   pneumonitis.  Mild interfissural fluid in left greater fissure.  No significant pleural effusion at the bases.  The right lung is clear.  No pneumothorax.  The bony structures are intact.    IMPRESSION:    1. Right lung clear.  2. Left-sided pneumonitis, as above.    < end of copied text >

## 2018-08-13 NOTE — PROGRESS NOTE ADULT - SUBJECTIVE AND OBJECTIVE BOX
CARDIOLOGY FOLLOW UP - Dr. Mathew    CC no cp /sob       PHYSICAL EXAM:  T(C): 36.8 (08-13-18 @ 08:01), Max: 37.4 (08-12-18 @ 16:18)  HR: 62 (08-13-18 @ 08:01) (62 - 71)  BP: 130/79 (08-13-18 @ 08:01) (114/64 - 130/79)  RR: 18 (08-13-18 @ 08:01) (18 - 19)  SpO2: 97% (08-13-18 @ 08:01) (95% - 97%)  Wt(kg): --  I&O's Summary    12 Aug 2018 07:01  -  13 Aug 2018 07:00  --------------------------------------------------------  IN: 480 mL / OUT: 0 mL / NET: 480 mL        Appearance: Normal	  Cardiovascular: Normal S1 S2,RRR, No JVD, No murmurs  Respiratory: slight exp wheeze  Gastrointestinal:  Soft, Non-tender, + BS	  Extremities: Normal range of motion, No clubbing, cyanosis or edema        MEDICATIONS  (STANDING):  ALBUTerol    0.083% 2.5 milliGRAM(s) Nebulizer every 4 hours  azithromycin  IVPB 500 milliGRAM(s) IV Intermittent every 24 hours  buDESOnide 160 MICROgram(s)/formoterol 4.5 MICROgram(s) Inhaler 2 Puff(s) Inhalation two times a day  cefTRIAXone   IVPB 1 Gram(s) IV Intermittent every 24 hours  enoxaparin Injectable 40 milliGRAM(s) SubCutaneous every 24 hours  fluticasone propionate 50 MICROgram(s)/spray Nasal Spray 1 Spray(s) Both Nostrils two times a day  pantoprazole    Tablet 40 milliGRAM(s) Oral two times a day  polyethylene glycol 3350 17 Gram(s) Oral daily  predniSONE   Tablet 40 milliGRAM(s) Oral daily  sucralfate suspension 1 Gram(s) Oral four times a day      TELEMETRY: 	    ECG:  	  RADIOLOGY:   DIAGNOSTIC TESTING:  [ ] Echocardiogram:  [ ]  Catheterization:  [ ] Stress Test:    OTHER: 	  < from: Xray Chest 2 Views PA/Lat (08.12.18 @ 13:42) >    IMPRESSION:    1. Right lung clear.  2. Left-sided pneumonitis, as above.    < end of copied text >    LABS: Internal med/CARDIOLOGY FOLLOW UP - Dr. Mathew    CC no cp /sob       PHYSICAL EXAM:  T(C): 36.8 (08-13-18 @ 08:01), Max: 37.4 (08-12-18 @ 16:18)  HR: 62 (08-13-18 @ 08:01) (62 - 71)  BP: 130/79 (08-13-18 @ 08:01) (114/64 - 130/79)  RR: 18 (08-13-18 @ 08:01) (18 - 19)  SpO2: 97% (08-13-18 @ 08:01) (95% - 97%)  Wt(kg): --  I&O's Summary    12 Aug 2018 07:01  -  13 Aug 2018 07:00  --------------------------------------------------------  IN: 480 mL / OUT: 0 mL / NET: 480 mL        Appearance: Normal	  Cardiovascular: Normal S1 S2,RRR, No JVD, No murmurs  Respiratory: slight exp wheeze  Gastrointestinal:  Soft, Non-tender, + BS	  Extremities: Normal range of motion, No clubbing, cyanosis or edema        MEDICATIONS  (STANDING):  ALBUTerol    0.083% 2.5 milliGRAM(s) Nebulizer every 4 hours  azithromycin  IVPB 500 milliGRAM(s) IV Intermittent every 24 hours  buDESOnide 160 MICROgram(s)/formoterol 4.5 MICROgram(s) Inhaler 2 Puff(s) Inhalation two times a day  cefTRIAXone   IVPB 1 Gram(s) IV Intermittent every 24 hours  enoxaparin Injectable 40 milliGRAM(s) SubCutaneous every 24 hours  fluticasone propionate 50 MICROgram(s)/spray Nasal Spray 1 Spray(s) Both Nostrils two times a day  pantoprazole    Tablet 40 milliGRAM(s) Oral two times a day  polyethylene glycol 3350 17 Gram(s) Oral daily  predniSONE   Tablet 40 milliGRAM(s) Oral daily  sucralfate suspension 1 Gram(s) Oral four times a day      TELEMETRY: 	    ECG:  	  RADIOLOGY:   DIAGNOSTIC TESTING:  [ ] Echocardiogram:  [ ]  Catheterization:  [ ] Stress Test:    OTHER: 	  < from: Xray Chest 2 Views PA/Lat (08.12.18 @ 13:42) >    IMPRESSION:    1. Right lung clear.  2. Left-sided pneumonitis, as above.    < end of copied text >    LABS:

## 2018-08-13 NOTE — PROGRESS NOTE ADULT - ASSESSMENT
23 year old male with no significant PMHx presenting with pneumonia.     1. Pneumonia -reactive airway  Cxr revealing left lung changes, may be secondary to mild infective process  CT chest w PITER PNA  cv stable no chest pain, no evidence of acute ischemia/ACS   continue pt. on ABX  repeat CXR left sided pneuomonitis   Nebs q4hr, po prednisone   echo to evaluate LV function/valvular disease   Pulm f/u    2. Dyspnea, hypoxia - resolved   secondary to PNA  check echo, r/o cmp    3. Nausea/vomiting/diarrhea-resolved  antiemetics PRN   PPI  GI f/u     DVT ppx 23 year old male with no significant PMHx presenting with pneumonia.     1. Pneumonia + reactive airway disease  Cxr revealing left lung changes, may be secondary to mild infective process  CT chest w PITER PNA  cv stable no chest pain, no evidence of acute ischemia/ACS   continue pt. on ABX  repeat CXR left sided pneuomonitis   Nebs q4hr, po prednisone   echo to evaluate LV function/valvular disease   Pulm f/u    2. Dyspnea, hypoxia - resolved   secondary to PNA  check echo, r/o cmp    3. Nausea/vomiting/diarrhea-resolved  antiemetics PRN   PPI  GI f/u     DVT ppx

## 2018-08-13 NOTE — PROGRESS NOTE ADULT - SUBJECTIVE AND OBJECTIVE BOX
CHIEF COMPLAINT:    Interval Events:    REVIEW OF SYSTEMS:  Constitutional: No fevers or chills. No weight loss. No fatigue or generalized malaise.  Eyes: No itching or discharge from the eyes  ENT: No ear pain. No ear discharge. No nasal congestion. No post nasal drip. No epistaxis. No throat pain. No sore throat. No difficulty swallowing.   CV: No chest pain. No palpitations. No lightheadedness or dizziness.   Resp: No dyspnea at rest. No dyspnea on exertion. No orthopnea. No wheezing. No cough. No stridor. No sputum production. No chest pain with respiration.  GI: No nausea. No vomiting. No diarrhea.  MSK: No joint pain or pain in any extremities  Integumentary: No skin lesions. No pedal edema.  Neurological: No gross motor weakness. No sensory changes.  [ ] All other systems negative  [ ] Unable to assess ROS because ________    OBJECTIVE:  ICU Vital Signs Last 24 Hrs  T(C): 36.8 (12 Aug 2018 23:26), Max: 37.4 (12 Aug 2018 16:18)  T(F): 98.2 (12 Aug 2018 23:26), Max: 99.4 (12 Aug 2018 16:18)  HR: 71 (12 Aug 2018 23:26) (61 - 71)  BP: 125/69 (12 Aug 2018 23:26) (104/65 - 125/69)  BP(mean): --  ABP: --  ABP(mean): --  RR: 18 (12 Aug 2018 23:26) (18 - 19)  SpO2: 95% (12 Aug 2018 23:26) (95% - 95%)        08-11 @ 07:01  -  08-12 @ 07:00  --------------------------------------------------------  IN: 500 mL / OUT: 0 mL / NET: 500 mL      CAPILLARY BLOOD GLUCOSE          PHYSICAL EXAM:  General: Awake, alert, oriented X 3.   HEENT: Atraumatic, normocephalic.                 Mallampatti Grade                 No nasal congestion.                No tonsillar or pharyngeal exudates.  Lymph Nodes: No palpable lymphadenopathy  Neck: No JVD. No carotid bruit.   Respiratory: Normal chest expansion                         Normal percussion                         Normal and equal air entry                         No wheeze, rhonchi or rales.  Cardiovascular: S1 S2 normal. No murmurs, rubs or gallops.   Abdomen: Soft, non-tender, non-distended. No organomegaly.  Extremities: Warm to touch. Peripheral pulse palpable. No pedal edema.   Skin: No rashes or skin lesions  Neurological: Motor and sensory examination equal and normal in all four extremities.  Psychiatry: Appropriate mood and affect.    HOSPITAL MEDICATIONS:  MEDICATIONS  (STANDING):  ALBUTerol    0.083% 2.5 milliGRAM(s) Nebulizer every 4 hours  azithromycin  IVPB 500 milliGRAM(s) IV Intermittent every 24 hours  cefTRIAXone   IVPB 1 Gram(s) IV Intermittent every 24 hours  enoxaparin Injectable 40 milliGRAM(s) SubCutaneous every 24 hours  pantoprazole    Tablet 40 milliGRAM(s) Oral two times a day  predniSONE   Tablet 40 milliGRAM(s) Oral daily  sucralfate suspension 1 Gram(s) Oral four times a day    MEDICATIONS  (PRN):  acetaminophen   Tablet 500 milliGRAM(s) Oral every 8 hours PRN For Temp greater than 38.5 C (101.3 F)  acetaminophen   Tablet. 650 milliGRAM(s) Oral every 6 hours PRN Moderate Pain (4 - 6)  ALBUTerol    0.083% 2.5 milliGRAM(s) Nebulizer every 2 hours PRN Shortness of Breath and/or Wheezing  guaiFENesin    Syrup 200 milliGRAM(s) Oral every 6 hours PRN Cough  ibuprofen  Tablet 600 milliGRAM(s) Oral every 8 hours PRN Severe pain  ondansetron Injectable 4 milliGRAM(s) IV Push every 6 hours PRN Nausea and/or Vomiting      LABS:                    MICROBIOLOGY:     RADIOLOGY:  [ ] Reviewed and interpreted by me    Point of Care Ultrasound Findings:    PFT:    EKG: CHIEF COMPLAINT: f/up for cough, PNA--better today but still sob and occasional nausea    Interval Events: steroids initiated    REVIEW OF SYSTEMS:  Constitutional: No fevers or chills. No weight loss. + fatigue or generalized malaise.  Eyes: No itching or discharge from the eyes  ENT: No ear pain. No ear discharge. No nasal congestion. No post nasal drip. No epistaxis. No throat pain. No sore throat. No difficulty swallowing.   CV: No chest pain. No palpitations. No lightheadedness or dizziness.   Resp: No dyspnea at rest. + dyspnea on exertion. No orthopnea. + wheezing. + cough. No stridor. No sputum production. No chest pain with respiration.  GI: + nausea. No vomiting. No diarrhea.  MSK: No joint pain or pain in any extremities  Integumentary: No skin lesions. No pedal edema. No BM  Neurological: No gross motor weakness. No sensory changes.  [ +] All other systems negative  [ ] Unable to assess ROS because ________    OBJECTIVE:  ICU Vital Signs Last 24 Hrs  T(C): 36.8 (12 Aug 2018 23:26), Max: 37.4 (12 Aug 2018 16:18)  T(F): 98.2 (12 Aug 2018 23:26), Max: 99.4 (12 Aug 2018 16:18)  HR: 71 (12 Aug 2018 23:26) (61 - 71)  BP: 125/69 (12 Aug 2018 23:26) (104/65 - 125/69)  BP(mean): --  ABP: --  ABP(mean): --  RR: 18 (12 Aug 2018 23:26) (18 - 19)  SpO2: 95% (12 Aug 2018 23:26) (95% - 95%)        08-11 @ 07:01  -  08-12 @ 07:00  --------------------------------------------------------  IN: 500 mL / OUT: 0 mL / NET: 500 mL      CAPILLARY BLOOD GLUCOSE          PHYSICAL EXAM: NAD in chair  General: Awake, alert, oriented X 3.   HEENT: Atraumatic, normocephalic.                 Mallampatti Grade 3                No nasal congestion.                No tonsillar or pharyngeal exudates.  Lymph Nodes: No palpable lymphadenopathy  Neck: No JVD. No carotid bruit.   Respiratory: Normal chest expansion                         Normal percussion                         Normal and equal air entry                         + wheeze,no rhonchi or rales.  Cardiovascular: S1 S2 normal. No murmurs, rubs or gallops.   Abdomen: Soft, non-tender, non-distended. No organomegaly.  Extremities: Warm to touch. Peripheral pulse palpable. No pedal edema.   Skin: No rashes or skin lesions  Neurological: Motor and sensory examination equal and normal in all four extremities.  Psychiatry: Appropriate mood and affect.    HOSPITAL MEDICATIONS:  MEDICATIONS  (STANDING):  ALBUTerol    0.083% 2.5 milliGRAM(s) Nebulizer every 4 hours  azithromycin  IVPB 500 milliGRAM(s) IV Intermittent every 24 hours  cefTRIAXone   IVPB 1 Gram(s) IV Intermittent every 24 hours  enoxaparin Injectable 40 milliGRAM(s) SubCutaneous every 24 hours  pantoprazole    Tablet 40 milliGRAM(s) Oral two times a day  predniSONE   Tablet 40 milliGRAM(s) Oral daily  sucralfate suspension 1 Gram(s) Oral four times a day    MEDICATIONS  (PRN):  acetaminophen   Tablet 500 milliGRAM(s) Oral every 8 hours PRN For Temp greater than 38.5 C (101.3 F)  acetaminophen   Tablet. 650 milliGRAM(s) Oral every 6 hours PRN Moderate Pain (4 - 6)  ALBUTerol    0.083% 2.5 milliGRAM(s) Nebulizer every 2 hours PRN Shortness of Breath and/or Wheezing  guaiFENesin    Syrup 200 milliGRAM(s) Oral every 6 hours PRN Cough  ibuprofen  Tablet 600 milliGRAM(s) Oral every 8 hours PRN Severe pain  ondansetron Injectable 4 milliGRAM(s) IV Push every 6 hours PRN Nausea and/or Vomiting      LABS:                    MICROBIOLOGY:     RADIOLOGY:  < from: Xray Chest 2 Views PA/Lat (08.12.18 @ 13:42) >  INTERPRETATION:  DATE OF STUDY: 8/12/18.    COMPARISON: 8/10/18 plain chest; had chest CT scan done 8/10/18.    CLINICAL INDICATION: 23-yo-male patient withpneumonia - followup.    TECHNIQUE: PA and lateral chest films.     FINDINGS:   The cardiomediastinal silhouette is unremarkable.   Scattered left lung opacities (throughout left lung) consistent with   pneumonitis.  Mild interfissural fluid in left greater fissure.  No significant pleural effusion at the bases.  The right lung is clear.  No pneumothorax.  The bony structures are intact.    IMPRESSION:    1. Right lung clear.  2. Left-sided pneumonitis, as above.    < end of copied text >    [ ] Reviewed and interpreted by me    Point of Care Ultrasound Findings:    PFT:    EKG:

## 2018-08-13 NOTE — PROGRESS NOTE ADULT - PROBLEM SELECTOR PLAN 3
prednisone 40mg per day to continue  duoneb via HHN q 6 h  symbicort 160 2 bid  pulmonary toilet-incentive spirometry, Chest Pt-acapella/chest vest-up to 5 times per day.    maintain oxygen levels above 90%-supplemental oxygen via nasal canula-humidified    All nebulized therapy is to be administered via hand held nebulizer-(patient must gargle and spit with water after use)

## 2018-08-13 NOTE — PROGRESS NOTE ADULT - ATTENDING COMMENTS
as above--  Underlying allergic rhinitis and likely asthma--now w/ PNA  D4 of IV ceftriaxone/zithromax of 8 total  Prednisone 40mg pre day to continue until inflammation reduced, duoneb via HHn q 6, symbicort 160 2 bid, astelin nasal spray  GI prophylaxis    Raza Mahmood MD-Pulmonary   [phone]

## 2018-08-14 PROCEDURE — 99232 SBSQ HOSP IP/OBS MODERATE 35: CPT

## 2018-08-14 PROCEDURE — 93306 TTE W/DOPPLER COMPLETE: CPT | Mod: 26

## 2018-08-14 RX ORDER — LANOLIN ALCOHOL/MO/W.PET/CERES
5 CREAM (GRAM) TOPICAL ONCE
Qty: 0 | Refills: 0 | Status: COMPLETED | OUTPATIENT
Start: 2018-08-14 | End: 2018-08-15

## 2018-08-14 RX ADMIN — ALBUTEROL 2.5 MILLIGRAM(S): 90 AEROSOL, METERED ORAL at 08:27

## 2018-08-14 RX ADMIN — BUDESONIDE AND FORMOTEROL FUMARATE DIHYDRATE 2 PUFF(S): 160; 4.5 AEROSOL RESPIRATORY (INHALATION) at 08:29

## 2018-08-14 RX ADMIN — Medication 40 MILLIGRAM(S): at 10:10

## 2018-08-14 RX ADMIN — PANTOPRAZOLE SODIUM 40 MILLIGRAM(S): 20 TABLET, DELAYED RELEASE ORAL at 10:09

## 2018-08-14 RX ADMIN — ALBUTEROL 2.5 MILLIGRAM(S): 90 AEROSOL, METERED ORAL at 17:45

## 2018-08-14 RX ADMIN — CEFTRIAXONE 100 GRAM(S): 500 INJECTION, POWDER, FOR SOLUTION INTRAMUSCULAR; INTRAVENOUS at 17:45

## 2018-08-14 RX ADMIN — BUDESONIDE AND FORMOTEROL FUMARATE DIHYDRATE 2 PUFF(S): 160; 4.5 AEROSOL RESPIRATORY (INHALATION) at 17:45

## 2018-08-14 RX ADMIN — Medication 1 SPRAY(S): at 17:45

## 2018-08-14 RX ADMIN — AZITHROMYCIN 250 MILLIGRAM(S): 500 TABLET, FILM COATED ORAL at 15:01

## 2018-08-14 RX ADMIN — ALBUTEROL 2.5 MILLIGRAM(S): 90 AEROSOL, METERED ORAL at 14:59

## 2018-08-14 RX ADMIN — ALBUTEROL 2.5 MILLIGRAM(S): 90 AEROSOL, METERED ORAL at 21:38

## 2018-08-14 RX ADMIN — Medication 1 SPRAY(S): at 10:10

## 2018-08-14 RX ADMIN — PANTOPRAZOLE SODIUM 40 MILLIGRAM(S): 20 TABLET, DELAYED RELEASE ORAL at 17:46

## 2018-08-14 RX ADMIN — Medication 20 MILLIGRAM(S): at 17:46

## 2018-08-14 NOTE — PROGRESS NOTE ADULT - SUBJECTIVE AND OBJECTIVE BOX
CC: f/u for pneumonia    Patient reports   no complaints at present, increased wheezes earlier, steroids increased.No GI complaints    REVIEW OF SYSTEMS:  All other review of systems negative (Comprehensive ROS)    Antimicrobials Day #  day 4, s/p 3 days of levaquin:  azithromycin  IVPB 500 milliGRAM(s) IV Intermittent every 24 hours  cefTRIAXone   IVPB 1 Gram(s) IV Intermittent every 24 hours    Other Medications Reviewed    T(F): 98.4 (08-14-18 @ 07:44), Max: 98.9 (08-14-18 @ 00:53)  HR: 61 (08-14-18 @ 07:44)  BP: 128/78 (08-14-18 @ 07:44)  RR: 18 (08-14-18 @ 07:44)  SpO2: 97% (08-14-18 @ 07:44)  Wt(kg): --    PHYSICAL EXAM:  General: alert, no acute distress  Eyes:  anicteric, no conjunctival injection, no discharge  Oropharynx: no lesions or injection 	  Neck: supple, without adenopathy  Lungs: few expiratory wheezes  Heart: regular rate and rhythm; no murmur, rubs or gallops  Abdomen: soft, nondistended, nontender, without mass or organomegaly  Skin: no lesions  Extremities: no clubbing, cyanosis, or edema  Neurologic: alert, oriented, moves all extremities    LAB RESULTS:              MICROBIOLOGY:  RECENT CULTURES:  08-11 @ 17:38 .Sputum Sputum     Normal Respiratory Precious present    Few polymorphonuclear leukocytes per low power field  Few Squamous epithelial cells per low power field  Moderate Gram Negative Rods per oil power field  Few Gram positive cocci in pairs, chains and clusters per oil power field    08-10 @ 13:32 .Blood Blood     No growth to date.          RADIOLOGY REVIEWED:  < from: Xray Chest 2 Views PA/Lat (08.12.18 @ 13:42) >  FINDINGS:   The cardiomediastinal silhouette is unremarkable.   Scattered left lung opacities (throughout left lung) consistent with   pneumonitis.  Mild interfissural fluid in left greater fissure.  No significant pleural effusion at the bases.  The right lung is clear.  No pneumothorax.  The bony structures are intact.    IMPRESSION:    1. Right lung clear.  2. Left-sided pneumonitis, as above.    < end of copied text >

## 2018-08-14 NOTE — PROGRESS NOTE ADULT - SUBJECTIVE AND OBJECTIVE BOX
CHIEF COMPLAINT: f/up for sob, PNA, asthmatic bronchitis--    Interval Events:    REVIEW OF SYSTEMS:  Constitutional: No fevers or chills. No weight loss. No fatigue or generalized malaise.  Eyes: No itching or discharge from the eyes  ENT: No ear pain. No ear discharge. No nasal congestion. No post nasal drip. No epistaxis. No throat pain. No sore throat. No difficulty swallowing.   CV: No chest pain. No palpitations. No lightheadedness or dizziness.   Resp: No dyspnea at rest. No dyspnea on exertion. No orthopnea. No wheezing. No cough. No stridor. No sputum production. No chest pain with respiration.  GI: No nausea. No vomiting. No diarrhea.  MSK: No joint pain or pain in any extremities  Integumentary: No skin lesions. No pedal edema.  Neurological: No gross motor weakness. No sensory changes.  [ ] All other systems negative  [ ] Unable to assess ROS because ________    OBJECTIVE:  ICU Vital Signs Last 24 Hrs  T(C): 37.2 (14 Aug 2018 00:53), Max: 37.2 (14 Aug 2018 00:53)  T(F): 98.9 (14 Aug 2018 00:53), Max: 98.9 (14 Aug 2018 00:53)  HR: 89 (14 Aug 2018 00:53) (62 - 89)  BP: 120/64 (14 Aug 2018 00:53) (110/54 - 130/79)  BP(mean): --  ABP: --  ABP(mean): --  RR: 18 (14 Aug 2018 00:53) (18 - 19)  SpO2: 95% (14 Aug 2018 00:53) (95% - 97%)        08-12 @ 07:01  -  08-13 @ 07:00  --------------------------------------------------------  IN: 480 mL / OUT: 0 mL / NET: 480 mL      CAPILLARY BLOOD GLUCOSE          PHYSICAL EXAM:  General: Awake, alert, oriented X 3.   HEENT: Atraumatic, normocephalic.                 Mallampatti Grade                 No nasal congestion.                No tonsillar or pharyngeal exudates.  Lymph Nodes: No palpable lymphadenopathy  Neck: No JVD. No carotid bruit.   Respiratory: Normal chest expansion                         Normal percussion                         Normal and equal air entry                         No wheeze, rhonchi or rales.  Cardiovascular: S1 S2 normal. No murmurs, rubs or gallops.   Abdomen: Soft, non-tender, non-distended. No organomegaly.  Extremities: Warm to touch. Peripheral pulse palpable. No pedal edema.   Skin: No rashes or skin lesions  Neurological: Motor and sensory examination equal and normal in all four extremities.  Psychiatry: Appropriate mood and affect.    HOSPITAL MEDICATIONS:  MEDICATIONS  (STANDING):  ALBUTerol    0.083% 2.5 milliGRAM(s) Nebulizer every 4 hours  azithromycin  IVPB 500 milliGRAM(s) IV Intermittent every 24 hours  buDESOnide 160 MICROgram(s)/formoterol 4.5 MICROgram(s) Inhaler 2 Puff(s) Inhalation two times a day  cefTRIAXone   IVPB 1 Gram(s) IV Intermittent every 24 hours  enoxaparin Injectable 40 milliGRAM(s) SubCutaneous every 24 hours  fluticasone propionate 50 MICROgram(s)/spray Nasal Spray 1 Spray(s) Both Nostrils two times a day  pantoprazole    Tablet 40 milliGRAM(s) Oral two times a day  polyethylene glycol 3350 17 Gram(s) Oral daily  predniSONE   Tablet 40 milliGRAM(s) Oral daily  sucralfate suspension 1 Gram(s) Oral four times a day    MEDICATIONS  (PRN):  acetaminophen   Tablet 500 milliGRAM(s) Oral every 8 hours PRN For Temp greater than 38.5 C (101.3 F)  acetaminophen   Tablet. 650 milliGRAM(s) Oral every 6 hours PRN Moderate Pain (4 - 6)  ALBUTerol    0.083% 2.5 milliGRAM(s) Nebulizer every 2 hours PRN Shortness of Breath and/or Wheezing  guaiFENesin    Syrup 200 milliGRAM(s) Oral every 6 hours PRN Cough  ibuprofen  Tablet 600 milliGRAM(s) Oral every 8 hours PRN Severe pain  ondansetron Injectable 4 milliGRAM(s) IV Push every 6 hours PRN Nausea and/or Vomiting      LABS:                    MICROBIOLOGY:     RADIOLOGY:  [ ] Reviewed and interpreted by me    Point of Care Ultrasound Findings:    PFT:    EKG: CHIEF COMPLAINT: f/up for sob, PNA, asthmatic bronchitis-- dry cough but fits present; sob over all    Interval Events: ambulated-showered    REVIEW OF SYSTEMS:  Constitutional: No fevers or chills. No weight loss. + fatigue or generalized malaise.  Eyes: No itching or discharge from the eyes  ENT: No ear pain. No ear discharge. No nasal congestion. No post nasal drip. No epistaxis. No throat pain. No sore throat. No difficulty swallowing.   CV: No chest pain. No palpitations. No lightheadedness or dizziness.   Resp: + dyspnea at rest. + dyspnea on exertion. No orthopnea. + wheezing. + cough. No stridor. No sputum production. No chest pain with respiration.  GI: No nausea. No vomiting. No diarrhea.  MSK: No joint pain or pain in any extremities  Integumentary: No skin lesions. + pedal edema.  Neurological: No gross motor weakness. No sensory changes.  [+ ] All other systems negative  [ ] Unable to assess ROS because ________    OBJECTIVE:  ICU Vital Signs Last 24 Hrs  T(C): 37.2 (14 Aug 2018 00:53), Max: 37.2 (14 Aug 2018 00:53)  T(F): 98.9 (14 Aug 2018 00:53), Max: 98.9 (14 Aug 2018 00:53)  HR: 89 (14 Aug 2018 00:53) (62 - 89)  BP: 120/64 (14 Aug 2018 00:53) (110/54 - 130/79)  BP(mean): --  ABP: --  ABP(mean): --  RR: 18 (14 Aug 2018 00:53) (18 - 19)  SpO2: 95% (14 Aug 2018 00:53) (95% - 97%)        08-12 @ 07:01  -  08-13 @ 07:00  --------------------------------------------------------  IN: 480 mL / OUT: 0 mL / NET: 480 mL      CAPILLARY BLOOD GLUCOSE          PHYSICAL EXAM: NAD in chair  General: Awake, alert, oriented X 3.   HEENT: Atraumatic, normocephalic.                 Mallampatti Grade 3                No nasal congestion.                No tonsillar or pharyngeal exudates.  Lymph Nodes: No palpable lymphadenopathy  Neck: No JVD. No carotid bruit.   Respiratory: abnormal chest expansion-reduced                         Normal percussion                         Normal and equal air entry                         + exp wheeze,no rhonchi or rales.  Cardiovascular: S1 S2 normal. No murmurs, rubs or gallops.   Abdomen: Soft, non-tender, non-distended. No organomegaly. NABS  Extremities: Warm to touch. Peripheral pulse palpable. No pedal edema.   Skin: No rashes or skin lesions  Neurological: Motor and sensory examination equal and normal in all four extremities.  Psychiatry: Appropriate mood and affect.    HOSPITAL MEDICATIONS:  MEDICATIONS  (STANDING):  ALBUTerol    0.083% 2.5 milliGRAM(s) Nebulizer every 4 hours  azithromycin  IVPB 500 milliGRAM(s) IV Intermittent every 24 hours  buDESOnide 160 MICROgram(s)/formoterol 4.5 MICROgram(s) Inhaler 2 Puff(s) Inhalation two times a day  cefTRIAXone   IVPB 1 Gram(s) IV Intermittent every 24 hours  enoxaparin Injectable 40 milliGRAM(s) SubCutaneous every 24 hours  fluticasone propionate 50 MICROgram(s)/spray Nasal Spray 1 Spray(s) Both Nostrils two times a day  pantoprazole    Tablet 40 milliGRAM(s) Oral two times a day  polyethylene glycol 3350 17 Gram(s) Oral daily  predniSONE   Tablet 40 milliGRAM(s) Oral daily  sucralfate suspension 1 Gram(s) Oral four times a day    MEDICATIONS  (PRN):  acetaminophen   Tablet 500 milliGRAM(s) Oral every 8 hours PRN For Temp greater than 38.5 C (101.3 F)  acetaminophen   Tablet. 650 milliGRAM(s) Oral every 6 hours PRN Moderate Pain (4 - 6)  ALBUTerol    0.083% 2.5 milliGRAM(s) Nebulizer every 2 hours PRN Shortness of Breath and/or Wheezing  guaiFENesin    Syrup 200 milliGRAM(s) Oral every 6 hours PRN Cough  ibuprofen  Tablet 600 milliGRAM(s) Oral every 8 hours PRN Severe pain  ondansetron Injectable 4 milliGRAM(s) IV Push every 6 hours PRN Nausea and/or Vomiting      LABS:                    MICROBIOLOGY:     RADIOLOGY:  [ ] Reviewed and interpreted by me    Point of Care Ultrasound Findings:    PFT:    EKG:

## 2018-08-14 NOTE — PROGRESS NOTE ADULT - ASSESSMENT
24 yo male with PNA on abx noted to have nausea and vomiting.  Appears to be improving.  ?contribution from GERD, gastritis.     1) continue protonix bid  2) continue carafate  3) No role for EGD at this time in the setting of PNA.   4) he is already scheduled for an egd/col at [place] by dr coronado, in late august  5) miralax prn, an dprunes for constipation

## 2018-08-14 NOTE — PROGRESS NOTE ADULT - PROBLEM SELECTOR PLAN 3
prednisone 40mg per day to continue  duoneb via HHN q 6 h  symbicort 160 2 bid  pulmonary toilet-incentive spirometry, Chest Pt-acapella/chest vest-up to 5 times per day.    maintain oxygen levels above 90%-supplemental oxygen via nasal canula-humidified    All nebulized therapy is to be administered via hand held nebulizer-(patient must gargle and spit with water after use) prednisone 40mg per day to boost to 60mg per day  duoneb via HHN q 6 h  symbicort 160 2 bid  pulmonary toilet-incentive spirometry, Chest Pt-acapella/chest vest-up to 5 times per day.    maintain oxygen levels above 90%-supplemental oxygen via nasal canula-humidified    All nebulized therapy is to be administered via hand held nebulizer-(patient must gargle and spit with water after use)

## 2018-08-14 NOTE — PROGRESS NOTE ADULT - ASSESSMENT
23M with no significant PMH presents for 1 week of productive cough, SOB, fevers associated with nausea and emesis. Here with left upper lobe pneumonia.    - currently D4 azithromycin and ceftriaxone to treat for community acquired pneumonia  Asthmatic type bronchitis-on BD and prednisone 40 mg per day 23M with no significant PMH presents for 1 week of productive cough, SOB, fevers associated with nausea and emesis. Here with left upper lobe pneumonia.    - currently D4 azithromycin and ceftriaxone to treat for community acquired pneumonia  Asthmatic type bronchitis-on BD and prednisone 40 mg per day    8/14-still bronchospastic--will boost pred to 60mg per day; for echo today

## 2018-08-14 NOTE — PROGRESS NOTE ADULT - SUBJECTIVE AND OBJECTIVE BOX
INTERVAL HPI/OVERNIGHT EVENTS: Tolerating Po.  Had good BM post prune juice. Did not require miralax.    MEDICATIONS  (STANDING):  acetaminophen   Tablet 500 milliGRAM(s) Oral every 8 hours PRN  acetaminophen   Tablet. 650 milliGRAM(s) Oral every 6 hours PRN  ALBUTerol    0.083% 2.5 milliGRAM(s) Nebulizer every 4 hours  ALBUTerol    0.083% 2.5 milliGRAM(s) Nebulizer every 2 hours PRN  azithromycin  IVPB 500 milliGRAM(s) IV Intermittent every 24 hours  buDESOnide 160 MICROgram(s)/formoterol 4.5 MICROgram(s) Inhaler 2 Puff(s) Inhalation two times a day  cefTRIAXone   IVPB 1 Gram(s) IV Intermittent every 24 hours  enoxaparin Injectable 40 milliGRAM(s) SubCutaneous every 24 hours  fluticasone propionate 50 MICROgram(s)/spray Nasal Spray 1 Spray(s) Both Nostrils two times a day  guaiFENesin    Syrup 200 milliGRAM(s) Oral every 6 hours PRN  ibuprofen  Tablet 600 milliGRAM(s) Oral every 8 hours PRN  ondansetron Injectable 4 milliGRAM(s) IV Push every 6 hours PRN  pantoprazole    Tablet 40 milliGRAM(s) Oral two times a day  polyethylene glycol 3350 17 Gram(s) Oral daily  predniSONE   Tablet 40 milliGRAM(s) Oral daily  sucralfate suspension 1 Gram(s) Oral four times a day      T(C): T(F): 98.4 (08-14-18 @ 07:44), Max: 98.9 (08-14-18 @ 00:53)  HR: 61 (08-14-18 @ 07:44) (61 - 89)  BP: 128/78 (08-14-18 @ 07:44) (110/54 - 128/78)  RR: 18 (08-14-18 @ 07:44) (18 - 19)  SpO2: 97% (08-14-18 @ 07:44) (95% - 97%)  Wt(kg): --  ,   I&O's Summary    36.8 (08-13-18 @ 08:01), Max: 37.4 (08-12-18 @ 16:18)  HR: 62 (08-13-18 @ 08:01) (62 - 71)  BP: 130/79 (08-13-18 @ 08:01) (114/64 - 130/79)  RR: 18 (08-13-18 @ 08:01) (18 - 19)  SpO2: 97% (08-13-18 @ 08:01) (95% - 97%)  Wt(kg): --    I&O's Summary    12 Aug 2018 07:01  -  13 Aug 2018 07:00  --------------------------------------------------------  IN: 480 mL / OUT: 0 mL / NET: 480 mL        Intolerances        Review of Systems:    General:  No wt loss, fevers, chills  ENT:  No sore throat, pain, runny nose, dysphagia  CV:  No pain, hypo/hypertension  Resp:  No dyspnea, cough, tachypnea, wheezing  Neuro:  No weakness, tingling, memory problems  Heme:  No petechiae, ecchymosis, easy bruisability    PHYSICAL EXAM:    Constitutional: NAD, well-developed  Neck: No LAD, supple  Respiratory: clear to auscultation b/l no rales, rhonchi, wheezing  Cardiovascular: S1 and S2, RRR, no murmur  Gastrointestinal: +BS x4, soft, NT/ND, neg HSM,  Extremities: No peripheral edema, neg clubbing, cyanosis  Vascular: 2+ peripheral pulses  Neurological: A/O x 3, no focal deficits  Psychiatric: Normal mood, normal affect  Skin: No rashes

## 2018-08-14 NOTE — PROGRESS NOTE ADULT - ASSESSMENT
CAP, ? component of aspiration  GERD with ? levaquin induced nausea and vomiting  Wheezing, now on steroids.still bronchospastic  Case reviewed with Dr Mahmood, he prefers continuing antibiotics for a few more days  Supportive care  Will check PC in AM

## 2018-08-14 NOTE — PROGRESS NOTE ADULT - ATTENDING COMMENTS
as above--  Underlying allergic rhinitis and likely asthma--now w/ PNA  D4 of IV ceftriaxone/zithromax of 8 total  Prednisone 40mg pre day to continue until inflammation reduced, duoneb via HHn q 6, symbicort 160 2 bid, astelin nasal spray  GI prophylaxis    Raza Mahmood MD-Pulmonary   [phone] as above--appears more bronchospastic  Underlying allergic rhinitis and likely asthma--now w/ PNA  D5 of IV ceftriaxone/zithromax of 8 total  Prednisone 40mg pre day to boost to 60mg PO until inflammation reduced, duoneb via HHn q 6, symbicort 160 2 bid, astelin nasal spray  GI prophylaxis; ambulate    Raza Mahmood MD-Pulmonary   [phone]

## 2018-08-14 NOTE — PROGRESS NOTE ADULT - ATTENDING COMMENTS
Patient seen and examined.  Agree with above NP note.  clinically stable and improved   cont iv abx  nebz  antittussives  echo normal  gi sx resolved  tolerating po diet  d/c plan for [facility]

## 2018-08-14 NOTE — PROGRESS NOTE ADULT - ASSESSMENT
23 year old male with no significant PMHx presenting with pneumonia.     1. Pneumonia + reactive airway disease  Cxr revealing left lung changes, may be secondary to mild infective process  CT chest w PITER PNA  cv stable no chest pain, no evidence of acute ischemia/ACS   continue pt. on ABX  repeat CXR left sided pneuomonitis   Nebs q4hr, po prednisone increased   echo to evaluate LV function/valvular disease   Pulm f/u    2. Dyspnea, hypoxia - resolved   secondary to PNA  check echo, r/o cmp    3. Nausea/vomiting/diarrhea-resolved  antiemetics PRN   PPI  GI f/u     DVT ppx

## 2018-08-14 NOTE — PROGRESS NOTE ADULT - SUBJECTIVE AND OBJECTIVE BOX
CARDIOLOGY FOLLOW UP - Dr. Mathew    CC no cp/sob       PHYSICAL EXAM:  T(C): 36.9 (08-14-18 @ 07:44), Max: 37.2 (08-14-18 @ 00:53)  HR: 61 (08-14-18 @ 07:44) (61 - 89)  BP: 128/78 (08-14-18 @ 07:44) (110/54 - 128/78)  RR: 18 (08-14-18 @ 07:44) (18 - 19)  SpO2: 97% (08-14-18 @ 07:44) (95% - 97%)  Wt(kg): --  I&O's Summary      Appearance: Normal	  Cardiovascular: Normal S1 S2,RRR, No JVD, No murmurs  Respiratory: exp wheeze   Gastrointestinal:  Soft, Non-tender, + BS	  Extremities: Normal range of motion, No clubbing, cyanosis or edema        MEDICATIONS  (STANDING):  ALBUTerol    0.083% 2.5 milliGRAM(s) Nebulizer every 4 hours  azithromycin  IVPB 500 milliGRAM(s) IV Intermittent every 24 hours  buDESOnide 160 MICROgram(s)/formoterol 4.5 MICROgram(s) Inhaler 2 Puff(s) Inhalation two times a day  cefTRIAXone   IVPB 1 Gram(s) IV Intermittent every 24 hours  enoxaparin Injectable 40 milliGRAM(s) SubCutaneous every 24 hours  fluticasone propionate 50 MICROgram(s)/spray Nasal Spray 1 Spray(s) Both Nostrils two times a day  pantoprazole    Tablet 40 milliGRAM(s) Oral two times a day  polyethylene glycol 3350 17 Gram(s) Oral daily  predniSONE   Tablet 20 milliGRAM(s) Oral once  predniSONE   Tablet 40 milliGRAM(s) Oral daily  sucralfate suspension 1 Gram(s) Oral four times a day      TELEMETRY: 	    ECG:  	  RADIOLOGY:   DIAGNOSTIC TESTING:  [ ] Echocardiogram:  [ ]  Catheterization:  [ ] Stress Test:    OTHER: 	    LABS:

## 2018-08-15 ENCOUNTER — TRANSCRIPTION ENCOUNTER (OUTPATIENT)
Age: 23
End: 2018-08-15

## 2018-08-15 VITALS
RESPIRATION RATE: 18 BRPM | DIASTOLIC BLOOD PRESSURE: 84 MMHG | HEART RATE: 64 BPM | TEMPERATURE: 99 F | SYSTOLIC BLOOD PRESSURE: 128 MMHG | OXYGEN SATURATION: 96 % | WEIGHT: 220.46 LBS

## 2018-08-15 LAB
CULTURE RESULTS: SIGNIFICANT CHANGE UP
CULTURE RESULTS: SIGNIFICANT CHANGE UP
SPECIMEN SOURCE: SIGNIFICANT CHANGE UP
SPECIMEN SOURCE: SIGNIFICANT CHANGE UP

## 2018-08-15 PROCEDURE — 96374 THER/PROPH/DIAG INJ IV PUSH: CPT

## 2018-08-15 PROCEDURE — 87040 BLOOD CULTURE FOR BACTERIA: CPT

## 2018-08-15 PROCEDURE — 94640 AIRWAY INHALATION TREATMENT: CPT

## 2018-08-15 PROCEDURE — 82803 BLOOD GASES ANY COMBINATION: CPT

## 2018-08-15 PROCEDURE — 96375 TX/PRO/DX INJ NEW DRUG ADDON: CPT

## 2018-08-15 PROCEDURE — 87070 CULTURE OTHR SPECIMN AEROBIC: CPT

## 2018-08-15 PROCEDURE — 85014 HEMATOCRIT: CPT

## 2018-08-15 PROCEDURE — 71250 CT THORAX DX C-: CPT

## 2018-08-15 PROCEDURE — 87581 M.PNEUMON DNA AMP PROBE: CPT

## 2018-08-15 PROCEDURE — 71046 X-RAY EXAM CHEST 2 VIEWS: CPT

## 2018-08-15 PROCEDURE — 80053 COMPREHEN METABOLIC PANEL: CPT

## 2018-08-15 PROCEDURE — 99285 EMERGENCY DEPT VISIT HI MDM: CPT | Mod: 25

## 2018-08-15 PROCEDURE — 84295 ASSAY OF SERUM SODIUM: CPT

## 2018-08-15 PROCEDURE — 82435 ASSAY OF BLOOD CHLORIDE: CPT

## 2018-08-15 PROCEDURE — 87798 DETECT AGENT NOS DNA AMP: CPT

## 2018-08-15 PROCEDURE — 83605 ASSAY OF LACTIC ACID: CPT

## 2018-08-15 PROCEDURE — 87633 RESP VIRUS 12-25 TARGETS: CPT

## 2018-08-15 PROCEDURE — 99232 SBSQ HOSP IP/OBS MODERATE 35: CPT

## 2018-08-15 PROCEDURE — 82947 ASSAY GLUCOSE BLOOD QUANT: CPT

## 2018-08-15 PROCEDURE — 93306 TTE W/DOPPLER COMPLETE: CPT

## 2018-08-15 PROCEDURE — 87449 NOS EACH ORGANISM AG IA: CPT

## 2018-08-15 PROCEDURE — 93005 ELECTROCARDIOGRAM TRACING: CPT

## 2018-08-15 PROCEDURE — 82330 ASSAY OF CALCIUM: CPT

## 2018-08-15 PROCEDURE — 87486 CHLMYD PNEUM DNA AMP PROBE: CPT

## 2018-08-15 PROCEDURE — 85027 COMPLETE CBC AUTOMATED: CPT

## 2018-08-15 PROCEDURE — 84132 ASSAY OF SERUM POTASSIUM: CPT

## 2018-08-15 RX ORDER — PANTOPRAZOLE SODIUM 20 MG/1
1 TABLET, DELAYED RELEASE ORAL
Qty: 60 | Refills: 0 | OUTPATIENT
Start: 2018-08-15 | End: 2018-09-13

## 2018-08-15 RX ORDER — BUDESONIDE AND FORMOTEROL FUMARATE DIHYDRATE 160; 4.5 UG/1; UG/1
2 AEROSOL RESPIRATORY (INHALATION)
Qty: 1 | Refills: 0 | OUTPATIENT
Start: 2018-08-15 | End: 2018-09-13

## 2018-08-15 RX ORDER — CIPROFLOXACIN LACTATE 400MG/40ML
1 VIAL (ML) INTRAVENOUS
Qty: 0 | Refills: 0 | COMMUNITY

## 2018-08-15 RX ORDER — FLUTICASONE PROPIONATE 50 MCG
1 SPRAY, SUSPENSION NASAL
Qty: 1 | Refills: 0 | OUTPATIENT
Start: 2018-08-15 | End: 2018-09-13

## 2018-08-15 RX ORDER — IPRATROPIUM/ALBUTEROL SULFATE 18-103MCG
3 AEROSOL WITH ADAPTER (GRAM) INHALATION
Qty: 360 | Refills: 0 | OUTPATIENT
Start: 2018-08-15 | End: 2018-09-13

## 2018-08-15 RX ADMIN — Medication 40 MILLIGRAM(S): at 07:59

## 2018-08-15 RX ADMIN — BUDESONIDE AND FORMOTEROL FUMARATE DIHYDRATE 2 PUFF(S): 160; 4.5 AEROSOL RESPIRATORY (INHALATION) at 17:09

## 2018-08-15 RX ADMIN — CEFTRIAXONE 100 GRAM(S): 500 INJECTION, POWDER, FOR SOLUTION INTRAMUSCULAR; INTRAVENOUS at 17:04

## 2018-08-15 RX ADMIN — AZITHROMYCIN 250 MILLIGRAM(S): 500 TABLET, FILM COATED ORAL at 12:30

## 2018-08-15 RX ADMIN — ALBUTEROL 2.5 MILLIGRAM(S): 90 AEROSOL, METERED ORAL at 17:10

## 2018-08-15 RX ADMIN — ALBUTEROL 2.5 MILLIGRAM(S): 90 AEROSOL, METERED ORAL at 07:59

## 2018-08-15 RX ADMIN — BUDESONIDE AND FORMOTEROL FUMARATE DIHYDRATE 2 PUFF(S): 160; 4.5 AEROSOL RESPIRATORY (INHALATION) at 07:59

## 2018-08-15 RX ADMIN — PANTOPRAZOLE SODIUM 40 MILLIGRAM(S): 20 TABLET, DELAYED RELEASE ORAL at 08:02

## 2018-08-15 RX ADMIN — ALBUTEROL 2.5 MILLIGRAM(S): 90 AEROSOL, METERED ORAL at 12:49

## 2018-08-15 RX ADMIN — Medication 1 SPRAY(S): at 17:10

## 2018-08-15 RX ADMIN — PANTOPRAZOLE SODIUM 40 MILLIGRAM(S): 20 TABLET, DELAYED RELEASE ORAL at 17:11

## 2018-08-15 RX ADMIN — Medication 1 SPRAY(S): at 07:59

## 2018-08-15 RX ADMIN — Medication 5 MILLIGRAM(S): at 00:11

## 2018-08-15 NOTE — DISCHARGE NOTE ADULT - ADDITIONAL INSTRUCTIONS
Follow up with Dr. Raza Mahmood (Pulmonologist) in 2 weeks Follow up with Dr. Raza Mahmood (Pulmonologist) in 2 weeks  Follow up with Dr. Mathew in 1 week

## 2018-08-15 NOTE — PROGRESS NOTE ADULT - ASSESSMENT
CAP, ? component of aspiration  GERD with ? levaquin induced nausea and vomiting  Wheezing, now on steroids.lungs sound much improved  Appreciate pulmonary f/u and plans, agree with stopping antibiotics after today's doses.  No need for additional oral antibiotics after discharege  PC level is pending, given clinical appearance it will not .  Agree with discharge planning

## 2018-08-15 NOTE — PROGRESS NOTE ADULT - SUBJECTIVE AND OBJECTIVE BOX
INTERVAL HPI/OVERNIGHT EVENTS:  Patient OOB to chair with visitor lying  in the bed.  Patient aroused during assessment , tolerating po diet with no reports of nausea or vomiting. Had BMs yesterday and today     MEDICATIONS  (STANDING):  ALBUTerol    0.083% 2.5 milliGRAM(s) Nebulizer every 4 hours  azithromycin  IVPB 500 milliGRAM(s) IV Intermittent every 24 hours  buDESOnide 160 MICROgram(s)/formoterol 4.5 MICROgram(s) Inhaler 2 Puff(s) Inhalation two times a day  cefTRIAXone   IVPB 1 Gram(s) IV Intermittent every 24 hours  enoxaparin Injectable 40 milliGRAM(s) SubCutaneous every 24 hours  fluticasone propionate 50 MICROgram(s)/spray Nasal Spray 1 Spray(s) Both Nostrils two times a day  pantoprazole    Tablet 40 milliGRAM(s) Oral two times a day  polyethylene glycol 3350 17 Gram(s) Oral daily  predniSONE   Tablet 40 milliGRAM(s) Oral daily  sucralfate suspension 1 Gram(s) Oral four times a day    MEDICATIONS  (PRN):  acetaminophen   Tablet 500 milliGRAM(s) Oral every 8 hours PRN For Temp greater than 38.5 C (101.3 F)  acetaminophen   Tablet. 650 milliGRAM(s) Oral every 6 hours PRN Moderate Pain (4 - 6)  ALBUTerol    0.083% 2.5 milliGRAM(s) Nebulizer every 2 hours PRN Shortness of Breath and/or Wheezing  guaiFENesin    Syrup 200 milliGRAM(s) Oral every 6 hours PRN Cough  ibuprofen  Tablet 600 milliGRAM(s) Oral every 8 hours PRN Severe pain  ondansetron Injectable 4 milliGRAM(s) IV Push every 6 hours PRN Nausea and/or Vomiting      Allergies    No Known Allergies    Intolerances        Review of Systems:    General:  No fevers or chills    ENT:  No sore throat, or dysphagia  CV:  No chest pain   Resp:  No dyspnea, cough, tachypnea, or wheezing  GI: no reports of nausea or vomiting, no abdominal pain , had BMs           Vital Signs Last 24 Hrs  T(C): 37.2 (15 Aug 2018 07:52), Max: 37.2 (15 Aug 2018 07:52)  T(F): 98.9 (15 Aug 2018 07:52), Max: 98.9 (15 Aug 2018 07:52)  HR: 64 (15 Aug 2018 07:52) (64 - 67)  BP: 128/84 (15 Aug 2018 07:52) (128/84 - 133/72)  BP(mean): --  RR: 18 (15 Aug 2018 07:52) (18 - 19)  SpO2: 96% (15 Aug 2018 07:52) (95% - 96%)    PHYSICAL EXAM:    Constitutional: non toxic and in NAD, well-developed  Neck: supple  Respiratory: anterior chest wall clear to auscultation  Cardiovascular: S1 and S2, RRR  Gastrointestinal: soft non distended with +BS   Extremities: No peripheral edema, neg clubbing, cyanosis  Neurological: A/O x 3, no focal deficits  Psychiatric: Normal mood, normal affect  Skin: No jaundice or visible rashes      LABS:    LIVER FUNCTIONS - ( 10 Aug 2018 12:04 )  Alb: 4.5 g/dL / Pro: 8.6 g/dL / ALK PHOS: 63 U/L / ALT: 20 U/L / AST: 15 U/L / GGT: x             RADIOLOGY & ADDITIONAL TESTS:  < from: Xray Chest 2 Views PA/Lat (08.12.18 @ 13:42) >  EXAM:  XR CHEST PA LAT 2V                            PROCEDURE DATE:  08/12/2018            INTERPRETATION:  DATE OF STUDY: 8/12/18.    COMPARISON: 8/10/18 plain chest; had chest CT scan done 8/10/18.    CLINICAL INDICATION: 23-yo-male patient withpneumonia - followup.    TECHNIQUE: PA and lateral chest films.     FINDINGS:   The cardiomediastinal silhouette is unremarkable.   Scattered left lung opacities (throughout left lung) consistent with   pneumonitis.  Mild interfissural fluid in left greater fissure.  No significant pleural effusion at the bases.  The right lung is clear.  No pneumothorax.  The bony structures are intact.    IMPRESSION:    1. Right lung clear.  2. Left-sided pneumonitis, as above.

## 2018-08-15 NOTE — PROGRESS NOTE ADULT - SUBJECTIVE AND OBJECTIVE BOX
CC: f/u for pneumonia    Patient reports a quite night, no dyspnea, n/v or significant cough.    REVIEW OF SYSTEMS:  All other review of systems negative (Comprehensive ROS)    Antimicrobials Day #  day 5, s/p 3 days of levaquin:  azithromycin  IVPB 500 milliGRAM(s) IV Intermittent every 24 hours  cefTRIAXone   IVPB 1 Gram(s) IV Intermittent every 24 hours    Other Medications Reviewed    T(F): 98.9 (08-15-18 @ 07:52), Max: 98.9 (08-15-18 @ 07:52)  HR: 64 (08-15-18 @ 07:52)  BP: 128/84 (08-15-18 @ 07:52)  RR: 18 (08-15-18 @ 07:52)  SpO2: 96% (08-15-18 @ 07:52)  Wt(kg): --    PHYSICAL EXAM:  General: alert, no acute distress  Eyes:  anicteric, no conjunctival injection, no discharge  Oropharynx: no lesions or injection 	  Neck: supple, without adenopathy  Lungs: clear to auscultation  Heart: regular rate and rhythm; no murmur, rubs or gallops  Abdomen: soft, nondistended, nontender, without mass or organomegaly  Skin: no lesions  Extremities: no clubbing, cyanosis, or edema  Neurologic: alert, oriented, moves all extremities    LAB RESULTS:              MICROBIOLOGY:  RECENT CULTURES:  08-11 @ 17:38 .Sputum Sputum     Normal Respiratory Precious present    Few polymorphonuclear leukocytes per low power field  Few Squamous epithelial cells per low power field  Moderate Gram Negative Rods per oil power field  Few Gram positive cocci in pairs, chains and clusters per oil power field    08-10 @ 13:32 .Blood Blood     No growth to date.          RADIOLOGY REVIEWED:  < from: Xray Chest 2 Views PA/Lat (08.12.18 @ 13:42) >  IMPRESSION:    1. Right lung clear.  2. Left-sided pneumonitis, as above.    < end of copied text >

## 2018-08-15 NOTE — PROGRESS NOTE ADULT - PROBLEM SELECTOR PLAN 2
D5 of ceftriaxone/zithromax of 8 total D6 of ceftriaxone/zithromax --would complete abx today (D/W ID)

## 2018-08-15 NOTE — PROGRESS NOTE ADULT - PROBLEM SELECTOR PLAN 3
prednisone 40mg per day to boost to 60mg per day  duoneb via HHN q 6 h  symbicort 160 2 bid  pulmonary toilet-incentive spirometry, Chest Pt-acapella/chest vest-up to 5 times per day.    maintain oxygen levels above 90%-supplemental oxygen via nasal canula-humidified    All nebulized therapy is to be administered via hand held nebulizer-(patient must gargle and spit with water after use) prednisone  60mg per day--can DC home on 40mg for 3 days then taper by 10mg every 3 days to off--add symbicort 160 2 bid  duoneb via HHN q 6 h  symbicort 160 2 bid  pulmonary toilet-incentive spirometry, Chest Pt-acapella/chest vest-up to 5 times per day.    maintain oxygen levels above 90%-supplemental oxygen via nasal canula-humidified    All nebulized therapy is to be administered via hand held nebulizer-(patient must gargle and spit with water after use)

## 2018-08-15 NOTE — DISCHARGE NOTE ADULT - MEDICATION SUMMARY - MEDICATIONS TO TAKE
I will START or STAY ON the medications listed below when I get home from the hospital:    Nebulizer machine  -- Indication: For Bronchospasm    predniSONE 10 mg oral tablet  -- 4 tab(s) by mouth once a day     4 tabs x 3 days  3 tabs x 3 days  2 tabs x 3 days  1 tab x 3 days  -- It is very important that you take or use this exactly as directed.  Do not skip doses or discontinue unless directed by your doctor.  Obtain medical advice before taking any non-prescription drugs as some may affect the action of this medication.  Take with food or milk.    -- Indication: For Steroids    Tylenol 325 mg oral capsule  -- Indication: For Analgesia    ProAir HFA 90 mcg/inh inhalation aerosol  -- 2 puff(s) inhaled 4 times a day, As Needed  -- Indication: For Bronchodilator    ipratropium-albuterol 0.5 mg-2.5 mg/3 mLinhalation solution  -- 3 milliliter(s) by nebulizer every 6 hours   -- For inhalation only.  It is very important that you take or use this exactly as directed.  Do not skip doses or discontinue unless directed by your doctor.  Obtain medical advice before taking any non-prescription drugs as some may affect the action of this medication.    -- Indication: For Bronchodilator    budesonide-formoterol 160 mcg-4.5 mcg/inh inhalation aerosol  -- 2 puff(s) inhaled 2 times a day   -- Indication: For Bronchodilator    guaiFENesin 100 mg/5 mL oral liquid  -- 10 milliliter(s) by mouth every 6 hours, As needed, Cough  -- Indication: For Expectorant    fluticasone 50 mcg/inh nasal spray  -- 1 spray(s) into nose 2 times a day  -- Indication: For Nasal spray    pantoprazole 40 mg oral delayed release tablet  -- 1 tab(s) by mouth 2 times a day  -- Indication: For PPI

## 2018-08-15 NOTE — PROGRESS NOTE ADULT - ASSESSMENT
Assessment:   Pateint is a 24 yo male with PNA on abx. the nausea and vomiting has resolved, tolerating po diet. Had bowel movement yesterday and today       Plan:   Monitor labs and trends as ordered   Continue PPI and Carafate   Monitor bowel movements while on antibiotics   Out patient GI follow up with primary GI as advised     RAMON Fernandez-[facility]  [address] [place]  [address] [phone]

## 2018-08-15 NOTE — PROGRESS NOTE ADULT - ASSESSMENT
23 year old male with no significant PMHx presenting with pneumonia.     1. Pneumonia + reactive airway disease  Cxr revealing left lung changes, may be secondary to mild infective process  CT chest w IPTER PNA  cv stable no chest pain, no evidence of acute ischemia/ACS   repeat CXR left sided pneuomonitis   last day of IV abx today    please send pt. home with duoneb/nebulizer rx  continue PO steroids   echo normal LV function   Pulm f/u    2. Dyspnea, hypoxia - resolved   secondary to PNA  Echo normal     3. Nausea/vomiting/diarrhea-resolved  antiemetics PRN   PPI  GI f/u     DVT ppx   D/C planning, f/u with Dr. Castillo in 1-2 weeks

## 2018-08-15 NOTE — PROGRESS NOTE ADULT - PROBLEM SELECTOR PLAN 6
DVT prophlaxis:  subcutaneous lovenox or heparin as per primary team  sequential teds stockings  early ambulation

## 2018-08-15 NOTE — DISCHARGE NOTE ADULT - CARE PLAN
Principal Discharge DX:	PNA (pneumonia)  Goal:	Resolution  Assessment and plan of treatment:	Pneumonia + reactive airway disease  Cxr revealing left lung changes, may be secondary to mild infective process  CT chest w PITER PNA  s/p Abx Tx (completed course)  repeat CXR left sided pneuomonitis   Nebs q4hr, po prednisone given and will be discharge on taper with follow up with Dr. Raza Mahmood (Pulmonologist) in 2 weeks  Secondary Diagnosis:	Vomiting  Assessment and plan of treatment:	Nausea/vomiting/diarrhea-resolved  Seen by ID- No intervention  PPI do continue use at home  Secondary Diagnosis:	Bronchospasm  Assessment and plan of treatment:	Prednisone  60mg per day--can DC home on 40mg for 3 days then taper by 10mg every 3 days to off--add symbicort 160 2 bid  duoneb via HHN q 6 h  symbicort 160 2 bid  Pt discharge with a taper and Rx for duonebs and duoneb machine

## 2018-08-15 NOTE — DISCHARGE NOTE ADULT - CARE PROVIDER_API CALL
Raza Mahmood), Internal Medicine; Pulmonary Disease  [address]  [address]  [place] [address]  Phone: [phone]  Fax: [phone]    Jarocho Mathew), Cardiovascular Disease; Internal Medicine; Interventional Cardiology; Nuclear Cardiology  [address]  [place] [address]  Phone: [phone]  Fax: [phone]

## 2018-08-15 NOTE — PROGRESS NOTE ADULT - ATTENDING COMMENTS
Patient seen and examined.  Agree with above NP note.  clinically stable and improved   iv abx completed  nebz, antittussives prn  echo normal  gi sx resolved  tolerating po diet  d/c today

## 2018-08-15 NOTE — DISCHARGE NOTE ADULT - NS AS DC FU INST LIST INST
HARI Outreach Follow Up    HARI note:  HARI reviewed chart.    He reported that he called Medicare on Tuesday to register complaint and was transferred to a three way call with Medicare and [employer].  SW spoke with patient and he confirmed that he received the CPAP machine.  Patient confirmed that he will have to pay approx $48 a month for rental.  SW encouraged patient to call both insurances to make sure they are paying.  Patient will decide if he is keeping the new cpap or sending it back.          Next Contact:  HARI will be removed from team and RNCC will reconsult as needed.      
no

## 2018-08-15 NOTE — DISCHARGE NOTE ADULT - PATIENT PORTAL LINK FT
You can access the [facility] Patient Portal, offered by [facility], by registering with the following website: [email or web address]

## 2018-08-15 NOTE — DISCHARGE NOTE ADULT - PLAN OF CARE
Resolution Pneumonia + reactive airway disease  Cxr revealing left lung changes, may be secondary to mild infective process  CT chest w PITER PNA  s/p Abx Tx (completed course)  repeat CXR left sided pneuomonitis   Nebs q4hr, po prednisone given and will be discharge on taper with follow up with Dr. Raza Mahmood (Pulmonologist) in 2 weeks Nausea/vomiting/diarrhea-resolved  Seen by ID- No intervention  PPI do continue use at home Prednisone  60mg per day--can DC home on 40mg for 3 days then taper by 10mg every 3 days to off--add symbicort 160 2 bid  duoneb via HHN q 6 h  symbicort 160 2 bid  Pt discharge with a taper and Rx for duonebs and duoneb machine

## 2018-08-15 NOTE — PROGRESS NOTE ADULT - SUBJECTIVE AND OBJECTIVE BOX
CARDIOLOGY FOLLOW UP - Dr. Mathew    CC no cp/sob   breathing much improved       PHYSICAL EXAM:  T(C): 37.2 (08-15-18 @ 07:52), Max: 37.2 (08-15-18 @ 07:52)  HR: 64 (08-15-18 @ 07:52) (64 - 67)  BP: 128/84 (08-15-18 @ 07:52) (128/84 - 133/72)  RR: 18 (08-15-18 @ 07:52) (18 - 19)  SpO2: 96% (08-15-18 @ 07:52) (95% - 96%)  Wt(kg): --  I&O's Summary    14 Aug 2018 07:01  -  15 Aug 2018 07:00  --------------------------------------------------------  IN: 480 mL / OUT: 0 mL / NET: 480 mL        Appearance: Normal	  Cardiovascular: Normal S1 S2,RRR, No JVD, No murmurs  Respiratory: Lungs clear to auscultation	  Gastrointestinal:  Soft, Non-tender, + BS	  Extremities: Normal range of motion, No clubbing, cyanosis or edema        MEDICATIONS  (STANDING):  ALBUTerol    0.083% 2.5 milliGRAM(s) Nebulizer every 4 hours  azithromycin  IVPB 500 milliGRAM(s) IV Intermittent every 24 hours  buDESOnide 160 MICROgram(s)/formoterol 4.5 MICROgram(s) Inhaler 2 Puff(s) Inhalation two times a day  cefTRIAXone   IVPB 1 Gram(s) IV Intermittent every 24 hours  enoxaparin Injectable 40 milliGRAM(s) SubCutaneous every 24 hours  fluticasone propionate 50 MICROgram(s)/spray Nasal Spray 1 Spray(s) Both Nostrils two times a day  pantoprazole    Tablet 40 milliGRAM(s) Oral two times a day  polyethylene glycol 3350 17 Gram(s) Oral daily  predniSONE   Tablet 40 milliGRAM(s) Oral daily  sucralfate suspension 1 Gram(s) Oral four times a day      TELEMETRY: 	    ECG:  	  RADIOLOGY:   DIAGNOSTIC TESTING:  [ ] Echocardiogram: < from: Transthoracic Echocardiogram (08.14.18 @ 11:15) >  Conclusions:  1. Normal left ventricular internal dimensions and wall  thicknesses.  2. Normal left ventricular systolic function.  *** No previous Echo exam.    < end of copied text >    [ ]  Catheterization:  [ ] Stress Test:    OTHER: 	    LABS:

## 2018-08-15 NOTE — PROGRESS NOTE ADULT - ASSESSMENT
23M with no significant PMH presents for 1 week of productive cough, SOB, fevers associated with nausea and emesis. Here with left upper lobe pneumonia.    - currently D4 azithromycin and ceftriaxone to treat for community acquired pneumonia  Asthmatic type bronchitis-on BD and prednisone 40 mg per day    8/14-still bronchospastic--will boost pred to 60mg per day; for echo today 23M with no significant PMH presents for 1 week of productive cough, SOB, fevers associated with nausea and emesis. Here with left upper lobe pneumonia.    - currently D4 azithromycin and ceftriaxone to treat for community acquired pneumonia  Asthmatic type bronchitis-on BD and prednisone 40 mg per day    8/14-still bronchospastic--will boost pred to 60mg per day;  echo normal  8/15--markedly improved

## 2018-08-15 NOTE — DISCHARGE NOTE ADULT - HOSPITAL COURSE
23 year old male with no significant PMHx presenting with pneumonia.     1. Pneumonia + reactive airway disease  Cxr revealing left lung changes, may be secondary to mild infective process  CT chest w PITER PNA  s/p Abx Tx (completed course)  repeat CXR left sided pneuomonitis   Nebs q4hr, po prednisone given and will be discharge on taper with follow up with Dr. Raza Mahmood (Pulmonologist) in 2 weeks        2. Dyspnea, hypoxia - resolved   secondary to PNA    3. Nausea/vomiting/diarrhea-resolved  Seen by ID- No intervention  PPI do continue use at home    Pt cleared for discharge by Dr. Mathew with follow up as OP with him and Dr. Mahmood

## 2018-08-15 NOTE — PROGRESS NOTE ADULT - ATTENDING COMMENTS
as above--appears more bronchospastic  Underlying allergic rhinitis and likely asthma--now w/ PNA  D5 of IV ceftriaxone/zithromax of 8 total  Prednisone 40mg pre day to boost to 60mg PO until inflammation reduced, duoneb via HHn q 6, symbicort 160 2 bid, astelin nasal spray  GI prophylaxis; ambulate    Raza Mahmood MD-Pulmonary   [phone] as above--appears markedly improved.  Underlying allergic rhinitis and likely asthma--now w/ PNA  D6 of IV ceftriaxone/zithromax-finish today  Prednisone at 60mg PO --would DC home on 40mg per day (taper by 10mg every 3 days to off) , duoneb via HHN q 6, symbicort 160 2 bid, astelin nasal spray. Should nebulizer at home.   GI prophylaxis;   Would DC home today if no other objections--out pt f/up 2 weeks    Raza Mahmood MD-Pulmonary   [phone]

## 2018-08-15 NOTE — PROGRESS NOTE ADULT - PROBLEM SELECTOR PLAN 1
multifactorial--asthmatic bronchitis, PNA, overweight

## 2018-08-15 NOTE — PROGRESS NOTE ADULT - PROVIDER SPECIALTY LIST ADULT
Cardiology
Gastroenterology
Infectious Disease
Pulmonology
Infectious Disease
Cardiology
Gastroenterology

## 2018-08-15 NOTE — PROGRESS NOTE ADULT - SUBJECTIVE AND OBJECTIVE BOX
CHIEF COMPLAINT: f/up for sob, PNA, asthmatic bronchitis--    Interval Events:    REVIEW OF SYSTEMS:  Constitutional: No fevers or chills. No weight loss. No fatigue or generalized malaise.  Eyes: No itching or discharge from the eyes  ENT: No ear pain. No ear discharge. No nasal congestion. No post nasal drip. No epistaxis. No throat pain. No sore throat. No difficulty swallowing.   CV: No chest pain. No palpitations. No lightheadedness or dizziness.   Resp: No dyspnea at rest. No dyspnea on exertion. No orthopnea. No wheezing. No cough. No stridor. No sputum production. No chest pain with respiration.  GI: No nausea. No vomiting. No diarrhea.  MSK: No joint pain or pain in any extremities  Integumentary: No skin lesions. No pedal edema.  Neurological: No gross motor weakness. No sensory changes.  [ ] All other systems negative  [ ] Unable to assess ROS because ________    OBJECTIVE:  ICU Vital Signs Last 24 Hrs  T(C): 36.7 (14 Aug 2018 21:52), Max: 36.9 (14 Aug 2018 07:44)  T(F): 98.1 (14 Aug 2018 21:52), Max: 98.4 (14 Aug 2018 07:44)  HR: 67 (14 Aug 2018 21:52) (61 - 67)  BP: 133/72 (14 Aug 2018 21:52) (128/78 - 133/72)  BP(mean): --  ABP: --  ABP(mean): --  RR: 19 (14 Aug 2018 21:52) (18 - 19)  SpO2: 95% (14 Aug 2018 21:52) (95% - 97%)        08-14 @ 07:01  -  08-15 @ 04:58  --------------------------------------------------------  IN: 480 mL / OUT: 0 mL / NET: 480 mL      CAPILLARY BLOOD GLUCOSE          PHYSICAL EXAM:  General: Awake, alert, oriented X 3.   HEENT: Atraumatic, normocephalic.                 Mallampatti Grade                 No nasal congestion.                No tonsillar or pharyngeal exudates.  Lymph Nodes: No palpable lymphadenopathy  Neck: No JVD. No carotid bruit.   Respiratory: Normal chest expansion                         Normal percussion                         Normal and equal air entry                         No wheeze, rhonchi or rales.  Cardiovascular: S1 S2 normal. No murmurs, rubs or gallops.   Abdomen: Soft, non-tender, non-distended. No organomegaly.  Extremities: Warm to touch. Peripheral pulse palpable. No pedal edema.   Skin: No rashes or skin lesions  Neurological: Motor and sensory examination equal and normal in all four extremities.  Psychiatry: Appropriate mood and affect.    HOSPITAL MEDICATIONS:  MEDICATIONS  (STANDING):  ALBUTerol    0.083% 2.5 milliGRAM(s) Nebulizer every 4 hours  azithromycin  IVPB 500 milliGRAM(s) IV Intermittent every 24 hours  buDESOnide 160 MICROgram(s)/formoterol 4.5 MICROgram(s) Inhaler 2 Puff(s) Inhalation two times a day  cefTRIAXone   IVPB 1 Gram(s) IV Intermittent every 24 hours  enoxaparin Injectable 40 milliGRAM(s) SubCutaneous every 24 hours  fluticasone propionate 50 MICROgram(s)/spray Nasal Spray 1 Spray(s) Both Nostrils two times a day  pantoprazole    Tablet 40 milliGRAM(s) Oral two times a day  polyethylene glycol 3350 17 Gram(s) Oral daily  predniSONE   Tablet 40 milliGRAM(s) Oral daily  sucralfate suspension 1 Gram(s) Oral four times a day    MEDICATIONS  (PRN):  acetaminophen   Tablet 500 milliGRAM(s) Oral every 8 hours PRN For Temp greater than 38.5 C (101.3 F)  acetaminophen   Tablet. 650 milliGRAM(s) Oral every 6 hours PRN Moderate Pain (4 - 6)  ALBUTerol    0.083% 2.5 milliGRAM(s) Nebulizer every 2 hours PRN Shortness of Breath and/or Wheezing  guaiFENesin    Syrup 200 milliGRAM(s) Oral every 6 hours PRN Cough  ibuprofen  Tablet 600 milliGRAM(s) Oral every 8 hours PRN Severe pain  ondansetron Injectable 4 milliGRAM(s) IV Push every 6 hours PRN Nausea and/or Vomiting      LABS:                    MICROBIOLOGY:     RADIOLOGY:  [ ] Reviewed and interpreted by me    Point of Care Ultrasound Findings:    PFT:    EKG: CHIEF COMPLAINT: f/up for sob, PNA, asthmatic bronchitis--better--good sleep--less fits of cough-np    Interval Events: walked--echo    REVIEW OF SYSTEMS:  Constitutional: No fevers or chills. No weight loss. + fatigue or generalized malaise.  Eyes: No itching or discharge from the eyes  ENT: No ear pain. No ear discharge. No nasal congestion. No post nasal drip. No epistaxis. No throat pain. No sore throat. No difficulty swallowing.   CV: No chest pain. No palpitations. No lightheadedness or dizziness.   Resp: No dyspnea at rest. + dyspnea on exertion. No orthopnea. + wheezing. +cough. No stridor. No sputum production. No chest pain with respiration.  GI: No nausea. No vomiting. No diarrhea.  MSK: No joint pain or pain in any extremities  Integumentary: No skin lesions. No pedal edema.  Neurological: No gross motor weakness. No sensory changes.  [+ ] All other systems negative  [ ] Unable to assess ROS because ________    OBJECTIVE:  ICU Vital Signs Last 24 Hrs  T(C): 36.7 (14 Aug 2018 21:52), Max: 36.9 (14 Aug 2018 07:44)  T(F): 98.1 (14 Aug 2018 21:52), Max: 98.4 (14 Aug 2018 07:44)  HR: 67 (14 Aug 2018 21:52) (61 - 67)  BP: 133/72 (14 Aug 2018 21:52) (128/78 - 133/72)  BP(mean): --  ABP: --  ABP(mean): --  RR: 19 (14 Aug 2018 21:52) (18 - 19)  SpO2: 95% (14 Aug 2018 21:52) (95% - 97%)        08-14 @ 07:01  -  08-15 @ 04:58  --------------------------------------------------------  IN: 480 mL / OUT: 0 mL / NET: 480 mL      CAPILLARY BLOOD GLUCOSE          PHYSICAL EXAM:  NAD  General: Awake, alert, oriented X 3.   HEENT: Atraumatic, normocephalic.                 Mallampatti Grade 3                No nasal congestion.                No tonsillar or pharyngeal exudates.  Lymph Nodes: No palpable lymphadenopathy  Neck: No JVD. No carotid bruit.   Respiratory: Normal chest expansion                         Normal percussion                         Normal and equal air entry                         No wheeze, rhonchi or rales.  Cardiovascular: S1 S2 normal. No murmurs, rubs or gallops.   Abdomen: Soft, non-tender, non-distended. No organomegaly. NABS  Extremities: Warm to touch. Peripheral pulse palpable. No pedal edema.   Skin: No rashes or skin lesions  Neurological: Motor and sensory examination equal and normal in all four extremities.  Psychiatry: Appropriate mood and affect.    HOSPITAL MEDICATIONS:  MEDICATIONS  (STANDING):  ALBUTerol    0.083% 2.5 milliGRAM(s) Nebulizer every 4 hours  azithromycin  IVPB 500 milliGRAM(s) IV Intermittent every 24 hours  buDESOnide 160 MICROgram(s)/formoterol 4.5 MICROgram(s) Inhaler 2 Puff(s) Inhalation two times a day  cefTRIAXone   IVPB 1 Gram(s) IV Intermittent every 24 hours  enoxaparin Injectable 40 milliGRAM(s) SubCutaneous every 24 hours  fluticasone propionate 50 MICROgram(s)/spray Nasal Spray 1 Spray(s) Both Nostrils two times a day  pantoprazole    Tablet 40 milliGRAM(s) Oral two times a day  polyethylene glycol 3350 17 Gram(s) Oral daily  predniSONE   Tablet 40 milliGRAM(s) Oral daily  sucralfate suspension 1 Gram(s) Oral four times a day    MEDICATIONS  (PRN):  acetaminophen   Tablet 500 milliGRAM(s) Oral every 8 hours PRN For Temp greater than 38.5 C (101.3 F)  acetaminophen   Tablet. 650 milliGRAM(s) Oral every 6 hours PRN Moderate Pain (4 - 6)  ALBUTerol    0.083% 2.5 milliGRAM(s) Nebulizer every 2 hours PRN Shortness of Breath and/or Wheezing  guaiFENesin    Syrup 200 milliGRAM(s) Oral every 6 hours PRN Cough  ibuprofen  Tablet 600 milliGRAM(s) Oral every 8 hours PRN Severe pain  ondansetron Injectable 4 milliGRAM(s) IV Push every 6 hours PRN Nausea and/or Vomiting      LABS:                    MICROBIOLOGY:     RADIOLOGY:  < from: Transthoracic Echocardiogram (08.14.18 @ 11:15) >  Observations:  Mitral Valve: Normal mitral valve. Minimal mitral  regurgitation.  Aortic Valve/Aorta: Calcified trileaflet aortic valve with  normal opening. Peak transaortic valve gradient equals 6 mm  Hg. Peak left ventricular outflow tract gradient equals 4  mm Hg.  Aortic Root: 3 cm.  Left Atrium: Normal left atrium.  LA volume index = 27  cc/m2.  Left Ventricle: Normal left ventricular systolic function.  Normal left ventricular internal dimensions and wall  thicknesses.  Right Heart: Right atrium not well visualized. The right  ventricle is not well visualized; grossly normal right  ventricular systolic function. Normal tricuspid valve.  Minimal tricuspid regurgitation. Normal pulmonic valve.  Pericardium/Pleura: Normal pericardium with no pericardial  effusion.  Hemodynamic: Estimated right atrial pressure is 8 mm Hg.  ------------------------------------------------------------------------  Conclusions:  1. Normal left ventricular internal dimensions and wall  thicknesses.  2. Normal left ventricular systolic function.  *** No previous Echo exam.  ------------------------------------------------------------------------    < end of copied text >    [ ] Reviewed and interpreted by me    Point of Care Ultrasound Findings:    PFT:    EKG:

## 2018-08-23 ENCOUNTER — APPOINTMENT (OUTPATIENT)
Dept: ALLERGY | Facility: CLINIC | Age: 23
End: 2018-08-23

## 2018-08-30 ENCOUNTER — APPOINTMENT (OUTPATIENT)
Dept: PULMONOLOGY | Facility: CLINIC | Age: 23
End: 2018-08-30
Payer: COMMERCIAL

## 2018-08-30 VITALS
WEIGHT: 230 LBS | HEART RATE: 97 BPM | SYSTOLIC BLOOD PRESSURE: 127 MMHG | DIASTOLIC BLOOD PRESSURE: 76 MMHG | OXYGEN SATURATION: 96 % | BODY MASS INDEX: 34.07 KG/M2 | HEIGHT: 69 IN

## 2018-08-30 DIAGNOSIS — E66.3 OVERWEIGHT: ICD-10-CM

## 2018-08-30 DIAGNOSIS — J98.4 OTHER DISORDERS OF LUNG: ICD-10-CM

## 2018-08-30 DIAGNOSIS — R09.82 POSTNASAL DRIP: ICD-10-CM

## 2018-08-30 DIAGNOSIS — Z87.39 PERSONAL HISTORY OF OTHER DISEASES OF THE MUSCULOSKELETAL SYSTEM AND CONNECTIVE TISSUE: ICD-10-CM

## 2018-08-30 DIAGNOSIS — R06.02 SHORTNESS OF BREATH: ICD-10-CM

## 2018-08-30 DIAGNOSIS — J18.9 PNEUMONIA, UNSPECIFIED ORGANISM: ICD-10-CM

## 2018-08-30 DIAGNOSIS — K22.4 DYSKINESIA OF ESOPHAGUS: ICD-10-CM

## 2018-08-30 DIAGNOSIS — Z87.09 PERSONAL HISTORY OF OTHER DISEASES OF THE RESPIRATORY SYSTEM: ICD-10-CM

## 2018-08-30 DIAGNOSIS — J45.909 UNSPECIFIED ASTHMA, UNCOMPLICATED: ICD-10-CM

## 2018-08-30 DIAGNOSIS — K90.49 MALABSORPTION DUE TO INTOLERANCE, NOT ELSEWHERE CLASSIFIED: ICD-10-CM

## 2018-08-30 PROCEDURE — 94727 GAS DIL/WSHOT DETER LNG VOL: CPT

## 2018-08-30 PROCEDURE — 99215 OFFICE O/P EST HI 40 MIN: CPT | Mod: 25

## 2018-08-30 PROCEDURE — 94618 PULMONARY STRESS TESTING: CPT

## 2018-08-30 PROCEDURE — 94729 DIFFUSING CAPACITY: CPT

## 2018-08-30 PROCEDURE — 71046 X-RAY EXAM CHEST 2 VIEWS: CPT

## 2018-08-30 PROCEDURE — 94060 EVALUATION OF WHEEZING: CPT | Mod: 59

## 2018-08-30 RX ORDER — ALBUTEROL SULFATE 90 UG/1
108 (90 BASE) AEROSOL, METERED RESPIRATORY (INHALATION) EVERY 6 HOURS
Qty: 3 | Refills: 1 | Status: ACTIVE | COMMUNITY
Start: 2018-08-30 | End: 1900-01-01

## 2018-09-07 ENCOUNTER — APPOINTMENT (OUTPATIENT)
Dept: ORTHOPEDIC SURGERY | Facility: CLINIC | Age: 23
End: 2018-09-07

## 2018-09-14 ENCOUNTER — APPOINTMENT (OUTPATIENT)
Dept: ORTHOPEDIC SURGERY | Facility: CLINIC | Age: 23
End: 2018-09-14
Payer: COMMERCIAL

## 2018-09-14 VITALS
HEIGHT: 69 IN | HEART RATE: 90 BPM | WEIGHT: 227 LBS | BODY MASS INDEX: 33.62 KG/M2 | DIASTOLIC BLOOD PRESSURE: 75 MMHG | SYSTOLIC BLOOD PRESSURE: 123 MMHG

## 2018-09-14 DIAGNOSIS — M51.36 OTHER INTERVERTEBRAL DISC DEGENERATION, LUMBAR REGION: ICD-10-CM

## 2018-09-14 PROCEDURE — 99214 OFFICE O/P EST MOD 30 MIN: CPT

## 2018-10-10 ENCOUNTER — APPOINTMENT (OUTPATIENT)
Dept: PULMONOLOGY | Facility: CLINIC | Age: 23
End: 2018-10-10

## 2018-10-19 ENCOUNTER — APPOINTMENT (OUTPATIENT)
Dept: PULMONOLOGY | Facility: CLINIC | Age: 23
End: 2018-10-19

## 2018-10-26 ENCOUNTER — APPOINTMENT (OUTPATIENT)
Dept: PULMONOLOGY | Facility: CLINIC | Age: 23
End: 2018-10-26

## 2019-02-26 ENCOUNTER — INPATIENT (INPATIENT)
Facility: HOSPITAL | Age: 24
LOS: 2 days | Discharge: HOME | DRG: 392 | End: 2019-03-01
Attending: INTERNAL MEDICINE | Admitting: INTERNAL MEDICINE
Payer: COMMERCIAL

## 2019-02-26 VITALS
HEART RATE: 105 BPM | OXYGEN SATURATION: 100 % | RESPIRATION RATE: 16 BRPM | SYSTOLIC BLOOD PRESSURE: 139 MMHG | HEIGHT: 68 IN | TEMPERATURE: 98 F | DIASTOLIC BLOOD PRESSURE: 77 MMHG | WEIGHT: 210.1 LBS

## 2019-02-26 DIAGNOSIS — K62.5 HEMORRHAGE OF ANUS AND RECTUM: ICD-10-CM

## 2019-02-26 LAB
ALBUMIN SERPL ELPH-MCNC: 4.1 G/DL — SIGNIFICANT CHANGE UP (ref 3.3–5)
ALP SERPL-CCNC: 75 U/L — SIGNIFICANT CHANGE UP (ref 40–120)
ALT FLD-CCNC: 20 U/L — SIGNIFICANT CHANGE UP (ref 10–45)
ANION GAP SERPL CALC-SCNC: 17 MMOL/L — SIGNIFICANT CHANGE UP (ref 5–17)
APPEARANCE UR: CLEAR — SIGNIFICANT CHANGE UP
AST SERPL-CCNC: 16 U/L — SIGNIFICANT CHANGE UP (ref 10–40)
BACTERIA # UR AUTO: NEGATIVE — SIGNIFICANT CHANGE UP
BILIRUB SERPL-MCNC: 0.3 MG/DL — SIGNIFICANT CHANGE UP (ref 0.2–1.2)
BILIRUB UR-MCNC: NEGATIVE — SIGNIFICANT CHANGE UP
BUN SERPL-MCNC: 9 MG/DL — SIGNIFICANT CHANGE UP (ref 7–23)
CALCIUM SERPL-MCNC: 9.8 MG/DL — SIGNIFICANT CHANGE UP (ref 8.4–10.5)
CHLORIDE SERPL-SCNC: 98 MMOL/L — SIGNIFICANT CHANGE UP (ref 96–108)
CO2 SERPL-SCNC: 24 MMOL/L — SIGNIFICANT CHANGE UP (ref 22–31)
COLOR SPEC: SIGNIFICANT CHANGE UP
CREAT SERPL-MCNC: 0.91 MG/DL — SIGNIFICANT CHANGE UP (ref 0.5–1.3)
DIFF PNL FLD: NEGATIVE — SIGNIFICANT CHANGE UP
EPI CELLS # UR: 3 /HPF — SIGNIFICANT CHANGE UP
GAS PNL BLDV: SIGNIFICANT CHANGE UP
GLUCOSE SERPL-MCNC: 107 MG/DL — HIGH (ref 70–99)
GLUCOSE UR QL: NEGATIVE — SIGNIFICANT CHANGE UP
HCT VFR BLD CALC: 36.3 % — LOW (ref 39–50)
HGB BLD-MCNC: 12.2 G/DL — LOW (ref 13–17)
HYALINE CASTS # UR AUTO: 1 /LPF — SIGNIFICANT CHANGE UP (ref 0–7)
KETONES UR-MCNC: SIGNIFICANT CHANGE UP
LEUKOCYTE ESTERASE UR-ACNC: NEGATIVE — SIGNIFICANT CHANGE UP
LIDOCAIN IGE QN: 10 U/L — SIGNIFICANT CHANGE UP (ref 7–60)
LYMPHOCYTES # BLD AUTO: 3 % — LOW (ref 13–44)
LYMPHOCYTES # BLD AUTO: 3.8 K/UL — HIGH (ref 1–3.3)
MCHC RBC-ENTMCNC: 24.4 PG — LOW (ref 27–34)
MCHC RBC-ENTMCNC: 33.8 GM/DL — SIGNIFICANT CHANGE UP (ref 32–36)
MCV RBC AUTO: 72.4 FL — LOW (ref 80–100)
MONOCYTES # BLD AUTO: 2.1 K/UL — HIGH (ref 0–0.9)
MONOCYTES NFR BLD AUTO: 6 % — SIGNIFICANT CHANGE UP (ref 2–14)
NEUTROPHILS # BLD AUTO: 9.6 K/UL — HIGH (ref 1.8–7.4)
NEUTROPHILS NFR BLD AUTO: 91 % — HIGH (ref 43–77)
NITRITE UR-MCNC: NEGATIVE — SIGNIFICANT CHANGE UP
PH UR: 7 — SIGNIFICANT CHANGE UP (ref 5–8)
PLATELET # BLD AUTO: 653 K/UL — HIGH (ref 150–400)
POTASSIUM SERPL-MCNC: 3.3 MMOL/L — LOW (ref 3.5–5.3)
POTASSIUM SERPL-SCNC: 3.3 MMOL/L — LOW (ref 3.5–5.3)
PROT SERPL-MCNC: 8.7 G/DL — HIGH (ref 6–8.3)
PROT UR-MCNC: ABNORMAL
RBC # BLD: 5.01 M/UL — SIGNIFICANT CHANGE UP (ref 4.2–5.8)
RBC # FLD: 14.6 % — HIGH (ref 10.3–14.5)
RBC CASTS # UR COMP ASSIST: 2 /HPF — SIGNIFICANT CHANGE UP (ref 0–4)
SODIUM SERPL-SCNC: 139 MMOL/L — SIGNIFICANT CHANGE UP (ref 135–145)
SP GR SPEC: >1.05 (ref 1.01–1.02)
UROBILINOGEN FLD QL: NEGATIVE — SIGNIFICANT CHANGE UP
WBC # BLD: 15.6 K/UL — HIGH (ref 3.8–10.5)
WBC # FLD AUTO: 15.6 K/UL — HIGH (ref 3.8–10.5)
WBC UR QL: 5 /HPF — SIGNIFICANT CHANGE UP (ref 0–5)

## 2019-02-26 PROCEDURE — 74177 CT ABD & PELVIS W/CONTRAST: CPT | Mod: 26

## 2019-02-26 PROCEDURE — 99285 EMERGENCY DEPT VISIT HI MDM: CPT

## 2019-02-26 RX ORDER — HEPARIN SODIUM 5000 [USP'U]/ML
5000 INJECTION INTRAVENOUS; SUBCUTANEOUS EVERY 12 HOURS
Qty: 0 | Refills: 0 | Status: DISCONTINUED | OUTPATIENT
Start: 2019-02-26 | End: 2019-03-01

## 2019-02-26 RX ORDER — BUDESONIDE AND FORMOTEROL FUMARATE DIHYDRATE 160; 4.5 UG/1; UG/1
2 AEROSOL RESPIRATORY (INHALATION)
Qty: 0 | Refills: 0 | Status: DISCONTINUED | OUTPATIENT
Start: 2019-02-26 | End: 2019-02-26

## 2019-02-26 RX ORDER — PIPERACILLIN AND TAZOBACTAM 4; .5 G/20ML; G/20ML
3.38 INJECTION, POWDER, LYOPHILIZED, FOR SOLUTION INTRAVENOUS EVERY 8 HOURS
Qty: 0 | Refills: 0 | Status: DISCONTINUED | OUTPATIENT
Start: 2019-02-26 | End: 2019-02-28

## 2019-02-26 RX ORDER — ONDANSETRON 8 MG/1
4 TABLET, FILM COATED ORAL EVERY 6 HOURS
Qty: 0 | Refills: 0 | Status: DISCONTINUED | OUTPATIENT
Start: 2019-02-26 | End: 2019-03-01

## 2019-02-26 RX ORDER — POTASSIUM CHLORIDE 20 MEQ
10 PACKET (EA) ORAL ONCE
Qty: 0 | Refills: 0 | Status: COMPLETED | OUTPATIENT
Start: 2019-02-26 | End: 2019-02-26

## 2019-02-26 RX ORDER — ONDANSETRON 8 MG/1
4 TABLET, FILM COATED ORAL ONCE
Qty: 0 | Refills: 0 | Status: COMPLETED | OUTPATIENT
Start: 2019-02-26 | End: 2019-02-26

## 2019-02-26 RX ORDER — PANTOPRAZOLE SODIUM 20 MG/1
40 TABLET, DELAYED RELEASE ORAL
Qty: 0 | Refills: 0 | Status: DISCONTINUED | OUTPATIENT
Start: 2019-02-26 | End: 2019-03-01

## 2019-02-26 RX ORDER — ACETAMINOPHEN 500 MG
1000 TABLET ORAL ONCE
Qty: 0 | Refills: 0 | Status: COMPLETED | OUTPATIENT
Start: 2019-02-26 | End: 2019-02-26

## 2019-02-26 RX ORDER — SODIUM CHLORIDE 9 MG/ML
1000 INJECTION INTRAMUSCULAR; INTRAVENOUS; SUBCUTANEOUS ONCE
Qty: 0 | Refills: 0 | Status: COMPLETED | OUTPATIENT
Start: 2019-02-26 | End: 2019-02-26

## 2019-02-26 RX ORDER — DEXTROSE MONOHYDRATE, SODIUM CHLORIDE, AND POTASSIUM CHLORIDE 50; .745; 4.5 G/1000ML; G/1000ML; G/1000ML
1000 INJECTION, SOLUTION INTRAVENOUS
Qty: 0 | Refills: 0 | Status: DISCONTINUED | OUTPATIENT
Start: 2019-02-26 | End: 2019-02-26

## 2019-02-26 RX ORDER — SODIUM CHLORIDE 9 MG/ML
1000 INJECTION INTRAMUSCULAR; INTRAVENOUS; SUBCUTANEOUS
Qty: 0 | Refills: 0 | Status: DISCONTINUED | OUTPATIENT
Start: 2019-02-26 | End: 2019-03-01

## 2019-02-26 RX ORDER — ALBUTEROL 90 UG/1
1 AEROSOL, METERED ORAL
Qty: 0 | Refills: 0 | Status: DISCONTINUED | OUTPATIENT
Start: 2019-02-26 | End: 2019-02-26

## 2019-02-26 RX ADMIN — PIPERACILLIN AND TAZOBACTAM 25 GRAM(S): 4; .5 INJECTION, POWDER, LYOPHILIZED, FOR SOLUTION INTRAVENOUS at 21:48

## 2019-02-26 RX ADMIN — Medication 1000 MILLIGRAM(S): at 19:49

## 2019-02-26 RX ADMIN — Medication 100 MILLIEQUIVALENT(S): at 19:50

## 2019-02-26 RX ADMIN — ONDANSETRON 4 MILLIGRAM(S): 8 TABLET, FILM COATED ORAL at 18:29

## 2019-02-26 RX ADMIN — SODIUM CHLORIDE 1000 MILLILITER(S): 9 INJECTION INTRAMUSCULAR; INTRAVENOUS; SUBCUTANEOUS at 18:29

## 2019-02-26 RX ADMIN — SODIUM CHLORIDE 1000 MILLILITER(S): 9 INJECTION INTRAMUSCULAR; INTRAVENOUS; SUBCUTANEOUS at 21:52

## 2019-02-26 RX ADMIN — Medication 400 MILLIGRAM(S): at 18:28

## 2019-02-26 NOTE — H&P ADULT - NSHPPHYSICALEXAM_GEN_ALL_CORE
PHYSICAL EXAMINATION:  Vital Signs Last 24 Hrs  T(C): 36.9 (26 Feb 2019 16:20), Max: 36.9 (26 Feb 2019 16:20)  T(F): 98.5 (26 Feb 2019 16:20), Max: 98.5 (26 Feb 2019 16:20)  HR: 105 (26 Feb 2019 16:20) (105 - 105)  BP: 139/77 (26 Feb 2019 16:20) (139/77 - 139/77)  BP(mean): --  RR: 16 (26 Feb 2019 16:20) (16 - 16)  SpO2: 100% (26 Feb 2019 16:20) (100% - 100%)  CAPILLARY BLOOD GLUCOSE            GENERAL: NAD, well-groomed,  HEAD:  atraumatic, normocephalic  EYES: sclera anicteric  ENMT: mucous membranes moist  NECK: supple, No JVD  CHEST/LUNG: clear to auscultation bilaterally;    no      rales   ,   no rhonchi,   HEART: normal S1, S2  ABDOMEN: BS+, soft, ND,, tender  to plapte/ no rebound. guarding  EXTREMITIES:    no    edema    b/l LEs  NEURO: awake, ,     moves all extremities  SKIN: no     rash PHYSICAL EXAMINATION:  Vital Signs Last 24 Hrs  T(C): 36.9 (26 Feb 2019 16:20), Max: 36.9 (26 Feb 2019 16:20)  T(F): 98.5 (26 Feb 2019 16:20), Max: 98.5 (26 Feb 2019 16:20)  HR: 105 (26 Feb 2019 16:20) (105 - 105)  BP: 139/77 (26 Feb 2019 16:20) (139/77 - 139/77)  BP(mean): --  RR: 16 (26 Feb 2019 16:20) (16 - 16)  SpO2: 100% (26 Feb 2019 16:20) (100% - 100%)  CAPILLARY BLOOD GLUCOSE            GENERAL: NAD, well-groomed,  HEAD:  atraumatic, normocephalic  EYES: sclera anicteric  ENMT: mucous membranes moist  NECK: supple, No JVD  CHEST/LUNG: clear to auscultation bilaterally;    no      rales   ,   no rhonchi,   HEART: normal S1, S2  ABDOMEN: BS+, soft, ND,, tender  to palpation, michael  in  epigastrium/  no rebound. guarding  EXTREMITIES:    no    edema    b/l LEs  NEURO: awake, ,     moves all extremities  SKIN: no     rash

## 2019-02-26 NOTE — ED PROVIDER NOTE - OBJECTIVE STATEMENT
Siri Ledbetter M.D: 23M suspected dx of ankylosing spondylitis, p/w worsening diarrhea over the past 3 days 15-20 episodes daily intermittently bloody. associated with this is fevers (tmax 102) and bilious vomiting (green) and epigastric abdominal pain. 15 lb weight loss in the last 2 weeks. Mane Castillo told him to come to ER

## 2019-02-26 NOTE — H&P ADULT - NSHPLABSRESULTS_GEN_ALL_CORE
LABS:                        12.2   15.6  )-----------( 653      ( 26 Feb 2019 18:39 )             36.3                       02-26 @ 18:39  3.1  21 LABS:                        12.2   15.6  )-----------( 653      ( 26 Feb 2019 18:39 )             36.3           LABS:                        12.2   15.6  )-----------( 653      ( 26 Feb 2019 18:39 )             36.3     02-26    139  |  98  |  9   ----------------------------<  107<H>  3.3<L>   |  24  |  0.91    Ca    9.8      26 Feb 2019 18:39    TPro  8.7<H>  /  Alb  4.1  /  TBili  0.3  /  DBili  x   /  AST  16  /  ALT  20  /  AlkPhos  75  02-26 02-26 @ 18:39  3.1  21                        02-26 @ 18:39  3.1  21

## 2019-02-26 NOTE — ED ADULT NURSE NOTE - CHPI ED NUR SYMPTOMS NEG
no fever/no chills/no blood in stool/no burning urination/no vomiting/no abdominal distension/no dysuria/no hematuria

## 2019-02-26 NOTE — H&P ADULT - NSHPREVIEWOFSYSTEMS_GEN_ALL_CORE
REVIEW OF SYSTEMS:  GEN:  fever,    no chills  RESP: no SOB,   no cough  CVS: no chest pain,   no palpitations  GI:  abdominal pain,   / nausea,  / vomiting,   no constipation,    has diarrhea  : no dysuria,   no frequency  NEURO: no headache,   no dizziness  PSYCH: no depression,   not anxious  Derm : no rash

## 2019-02-26 NOTE — H&P ADULT - HISTORY OF PRESENT ILLNESS
High Risk Travel:  International Travel? No(1)    · Chief Complaint: The patient is a 23y Male complaining of	  · HPI Objective Statement: Siri Ledbetter M.D: 23M suspected dx of ankylosing spondylitis, p/w worsening diarrhea over the past 3 days 15-20 episodes daily intermittently bloody. associated with this is fevers (tmax 102) and bilious vomiting (green) and epigastric abdominal pain. 15 lb weight loss in the last 2 weeks. Mane Castillo told him to come to ER High Risk Travel:  International Travel? No(1)    	        23 yr old,    dx of ankylosing spondylitis, p/w worsening diarrhea over the past 3 days 15-20 episodes daily intermittently bloody. associated with  fevers (tmax 102) and bilious vomiting (green) and epigastric abdominal pain. 15 lb weight loss in the last 2 weeks.   currently in peds unit High Risk Travel:  International Travel? No(1)    	        23 yr old,   h/o  gastritis from   nsaid  use,  c/c  back pain  p/w worsening diarrhea over the past 3 days 15-20 episodes daily intermittently bloody. associated with  fevers (tmax 102) and bilious vomiting (green) and epigastric abdominal pain. 15 lb weight loss in the last 2 weeks. sent  by his  gi  dr/ c diff, was  testes as  an op,a nd  was  negative   currently in peds unit

## 2019-02-26 NOTE — H&P ADULT - ASSESSMENT
pt with   colitis, by history     now  admitted with fevers/ vomiting/ profuse diarrhea   and abdominal pain  in er, with elevated wbc/  tachycardia    ct  abdomen, pending   gi  eval   on  zosyn/ iv steroids  npo/  zofran  dvt ppx/ labs in am  will need  colonoscopy pt with    nsaid  induced  gastritis  by history/  c/c  back pain/ obesity     now  admitted with fevers/ vomiting/ profuse diarrhea, with bloody  diarrhea  at times,    and abdominal pain  in er, with elevated wbc/  tachycardia    ct  abdomen, pending   gi  eval   on  zosyn/   zofran  npo  dvt ppx/   labs in am  will need  colonoscopy  family  at  bedside

## 2019-02-26 NOTE — ED ADULT NURSE NOTE - OBJECTIVE STATEMENT
23 year old male presented to ED with c/o of N/V/D x3 days, 15-20 episodes daily, describes diarrhea as runny/bloody, vomit described as green, associated with intermittent epigastric pain. pt has suspected ankylosing spondylitis. pt denies CP, SOB, nausea/vomiting, numbness/tingling, fever, cough, chills, dizziness, headache, blurred vision, neuro intact. pt a&ox3, lung sounds clear, heart rate regular, abdomen soft nontender nondistended to palp. skin intact. IV in right AC 20G and patent. pt currently resting in bed comfortably with family at bedside. Will continue to monitor and assess while offering support and reassurance.

## 2019-02-27 LAB
ANION GAP SERPL CALC-SCNC: 15 MMOL/L — SIGNIFICANT CHANGE UP (ref 5–17)
BUN SERPL-MCNC: 7 MG/DL — SIGNIFICANT CHANGE UP (ref 7–23)
CALCIUM SERPL-MCNC: 9 MG/DL — SIGNIFICANT CHANGE UP (ref 8.4–10.5)
CHLORIDE SERPL-SCNC: 102 MMOL/L — SIGNIFICANT CHANGE UP (ref 96–108)
CO2 SERPL-SCNC: 22 MMOL/L — SIGNIFICANT CHANGE UP (ref 22–31)
CREAT SERPL-MCNC: 0.9 MG/DL — SIGNIFICANT CHANGE UP (ref 0.5–1.3)
CULTURE RESULTS: SIGNIFICANT CHANGE UP
GLUCOSE SERPL-MCNC: 95 MG/DL — SIGNIFICANT CHANGE UP (ref 70–99)
MAGNESIUM SERPL-MCNC: 2.3 MG/DL — SIGNIFICANT CHANGE UP (ref 1.6–2.6)
PHOSPHATE SERPL-MCNC: 3.9 MG/DL — SIGNIFICANT CHANGE UP (ref 2.5–4.5)
POTASSIUM SERPL-MCNC: 3.5 MMOL/L — SIGNIFICANT CHANGE UP (ref 3.5–5.3)
POTASSIUM SERPL-SCNC: 3.5 MMOL/L — SIGNIFICANT CHANGE UP (ref 3.5–5.3)
SODIUM SERPL-SCNC: 139 MMOL/L — SIGNIFICANT CHANGE UP (ref 135–145)
SPECIMEN SOURCE: SIGNIFICANT CHANGE UP

## 2019-02-27 PROCEDURE — 99222 1ST HOSP IP/OBS MODERATE 55: CPT

## 2019-02-27 RX ORDER — SOD SULF/SODIUM/NAHCO3/KCL/PEG
1000 SOLUTION, RECONSTITUTED, ORAL ORAL EVERY 12 HOURS
Qty: 0 | Refills: 0 | Status: COMPLETED | OUTPATIENT
Start: 2019-02-27 | End: 2019-02-28

## 2019-02-27 RX ORDER — LANOLIN ALCOHOL/MO/W.PET/CERES
3 CREAM (GRAM) TOPICAL ONCE
Qty: 0 | Refills: 0 | Status: COMPLETED | OUTPATIENT
Start: 2019-02-27 | End: 2019-02-27

## 2019-02-27 RX ORDER — INFLUENZA VIRUS VACCINE 15; 15; 15; 15 UG/.5ML; UG/.5ML; UG/.5ML; UG/.5ML
0.5 SUSPENSION INTRAMUSCULAR ONCE
Qty: 0 | Refills: 0 | Status: DISCONTINUED | OUTPATIENT
Start: 2019-02-27 | End: 2019-03-01

## 2019-02-27 RX ORDER — LACTOBACILLUS ACIDOPHILUS 100MM CELL
1 CAPSULE ORAL
Qty: 0 | Refills: 0 | Status: DISCONTINUED | OUTPATIENT
Start: 2019-02-27 | End: 2019-03-01

## 2019-02-27 RX ORDER — POTASSIUM CHLORIDE 20 MEQ
20 PACKET (EA) ORAL
Qty: 0 | Refills: 0 | Status: COMPLETED | OUTPATIENT
Start: 2019-02-27 | End: 2019-02-27

## 2019-02-27 RX ADMIN — Medication 20 MILLIEQUIVALENT(S): at 15:50

## 2019-02-27 RX ADMIN — ONDANSETRON 4 MILLIGRAM(S): 8 TABLET, FILM COATED ORAL at 20:09

## 2019-02-27 RX ADMIN — PANTOPRAZOLE SODIUM 40 MILLIGRAM(S): 20 TABLET, DELAYED RELEASE ORAL at 05:11

## 2019-02-27 RX ADMIN — SODIUM CHLORIDE 80 MILLILITER(S): 9 INJECTION INTRAMUSCULAR; INTRAVENOUS; SUBCUTANEOUS at 00:20

## 2019-02-27 RX ADMIN — PIPERACILLIN AND TAZOBACTAM 25 GRAM(S): 4; .5 INJECTION, POWDER, LYOPHILIZED, FOR SOLUTION INTRAVENOUS at 13:07

## 2019-02-27 RX ADMIN — Medication 1 TABLET(S): at 11:16

## 2019-02-27 RX ADMIN — Medication 3 MILLIGRAM(S): at 22:15

## 2019-02-27 RX ADMIN — Medication 3 MILLIGRAM(S): at 00:18

## 2019-02-27 RX ADMIN — PIPERACILLIN AND TAZOBACTAM 25 GRAM(S): 4; .5 INJECTION, POWDER, LYOPHILIZED, FOR SOLUTION INTRAVENOUS at 22:01

## 2019-02-27 RX ADMIN — Medication 1000 MILLILITER(S): at 18:05

## 2019-02-27 RX ADMIN — Medication 20 MILLIEQUIVALENT(S): at 18:05

## 2019-02-27 RX ADMIN — ONDANSETRON 4 MILLIGRAM(S): 8 TABLET, FILM COATED ORAL at 05:11

## 2019-02-27 RX ADMIN — PIPERACILLIN AND TAZOBACTAM 25 GRAM(S): 4; .5 INJECTION, POWDER, LYOPHILIZED, FOR SOLUTION INTRAVENOUS at 05:11

## 2019-02-27 RX ADMIN — Medication 1 TABLET(S): at 18:05

## 2019-02-27 NOTE — CONSULT NOTE ADULT - ASSESSMENT
23 year old male with history of 20 pound weight loss with abdominal pain, diarrhea, hematochezia, fever and new findings of colitis on CT (Left sided) with concern for possible new Dx UC as well as b/l sacroiliits. Also with long term NSAIDs use    RECS:    -PO clears today  -bowel prep  -PPI  -can continue Zosyn for now  -Hold off on steroids until endoscopic evaluation  -Will send off Hep B serologies and Quant. Gold serologies in case pt may ultimately require biologic Rx for treatment  -Monitor and replete electrolytes (will give Kcl as he will be receiving bowel prep)    Discussed with patient and family at bedside, all questions answered  Outside reports and labs reviewed, copy made for chart    Thank you for the courtesy of this consult.  Toan Mata PA-C    [facility]  [phone] 23 year old male with history of 20 pound weight loss with abdominal pain, diarrhea, hematochezia, fever and new findings of colitis on CT (Left sided) with concern for possible new Dx UC as well as b/l sacroiliits. Also with long term NSAIDs use.    RECS:  -PO clears today  -bowel prep  -PPI  -can continue Zosyn for now  -Hold off on steroids until endoscopic evaluation  -Will send off Hep B serologies and Quant. Gold serologies in case pt may ultimately require biologic Rx for treatment  -Monitor and replete electrolytes (will give Kcl as he will be receiving bowel prep)    Discussed with patient and family at bedside, all questions answered  Outside reports and labs reviewed, copy made for chart    Thank you for the courtesy of this consult.  Toan Mata PA-C    [facility]  [phone]

## 2019-02-27 NOTE — PATIENT PROFILE ADULT - NSPROPOAURINARYCATHETER_GEN_A_NUR
NEPHROLOGY INTERVAL HPI/OVERNIGHT EVENTS:    HPI:  HPI: :: 81 y/o male with PMHx of COPD, CHF, chronic renal failure, paroxysmal A-fib.with last hospitaliztion 10/2017 for hematochezia s/p bleeding scan and flex sigmoidoscopy  was BIBA for acute worsening lower back pain radiating to buttock to foot   patient with amber boots for chronic venous stasis ulcers and lower extremity chronic edema ,daughter reports increased oozing from ulcers in lai past few weeks, no fever  In Ed patient noted to have Hb 6 and stool guaiac positive  and rectal exam- with melena  done by ED physician as per note   Patient denies any chest pain or worsening of SOB ,no abdominal pain or vomiting ,no diarrhea, no fever or chills    4/2  feels better today  anticipating dc to rehab tomorrow  daughter bat bedside  started Lasix 40 mg po bid yesterday  good uo as per pt, creat improving  less leg edema    PMHx: ::  HTN,  COPD on home O2 2L,  SHAYY on nocturnal BIPAP,  Chronic diastolic CHF,  CAD s/p PCI with stent in 2002,  Last cath in july 2014 with RCA disease medically managed, Hyperlipidemia,  PVD,  Iron deficiency anemia,  Chronic back pain,  Gout,  BPH,  CKD III with baseline Cr 2.2,  PAF not on a/c,  Hx of GI Bleed in the past, hemorrhoids s/p banding,  PSHx: ::  Right rotator cuff repair  pilonidal cyst  Family History: ::  Father: rectal CA  Mother: HTN, DM  3 siblings: DM  Social History: ::  Tobacco: smoked 1ppd X 50 years, quit 22 years ago.  Rare ETOH use.  wife recently passed away last year (21 Mar 2017 06:35)      PAST MEDICAL & SURGICAL HISTORY:  Sepsis, due to unspecified organism: 2/2 poorly healing wounds b/l  BPH (benign prostatic hypertrophy)  Hyperlipemia  Coronary artery disease  Hypertension  Dyspepsia: On moderate exertion.  Sleep apnea, obstructive: Requires home 02 therapy, and treatment with BIPAP  Atelectasis  Pleural effusion, bilateral  Respiratory failure  Peripheral edema  CRI (chronic renal insufficiency)  Gout  CRF (chronic renal failure)  Benign prostatic hypertrophy  Spinal stenosis  Hypercholesterolemia  GERD (gastroesophageal reflux disease)  CAD (coronary artery disease)  Hypertension  S/P angioplasty with stent  Cataract of left eye  Prostate: Surgery green light procedure.  S/P rotator cuff surgery: Right  S/P angioplasty  Rotator cuff tear, right: repair      FAMILY HISTORY:  No pertinent family history in first degree relatives      MEDICATIONS  (STANDING):  finasteride 5milliGRAM(s) Oral daily  buDESOnide   0.5 milliGRAM(s) Respule 0.5milliGRAM(s) Nebulizer two times a day  doxazosin 8milliGRAM(s) Oral at bedtime  isosorbide   mononitrate ER Tablet (IMDUR) 120milliGRAM(s) Oral daily  gabapentin Oral Tab/Cap - Peds 600milliGRAM(s) Oral at bedtime  allopurinol 100milliGRAM(s) Oral daily  loratadine 10milliGRAM(s) Oral daily  fenofibrate Tablet 145milliGRAM(s) Oral daily  simvastatin 10milliGRAM(s) Oral at bedtime  pramipexole 0.125milliGRAM(s) Oral three times a day  metoprolol Oral Tab/Cap - Peds 12.5milliGRAM(s) Oral two times a day  hydrALAZINE 150milliGRAM(s) Oral two times a day  ammonium lactate 12% Lotion 1Application(s) Topical two times a day  diltiazem   CD 240milliGRAM(s) Oral daily  senna 2Tablet(s) Oral at bedtime  glycerin Suppository - Adult 1Suppository(s) Rectal daily  pantoprazole Infusion 8mG/Hr IV Continuous <Continuous>  fluticasone propionate 50 MICROgram(s)/spray Nasal Spray 1Spray(s) Both Nostrils two times a day  furosemide    Tablet 40milliGRAM(s) Oral two times a day  ferrous    sulfate 325milliGRAM(s) Oral daily    MEDICATIONS  (PRN):  nitroglycerin     SubLingual 0.4milliGRAM(s) SubLingual every 5 minutes PRN Chest Pain  ondansetron Injectable 4milliGRAM(s) IV Push every 6 hours PRN Nausea and/or Vomiting  diphenhydrAMINE   Elixir 12.5milliGRAM(s) Oral every 6 hours PRN Rash and/or Itching  oxyCODONE IR 5milliGRAM(s) Oral two times a day PRN Severe Pain (7 - 10)  acetaminophen   Tablet 650milliGRAM(s) Oral every 6 hours PRN For Temp greater than 38 C (100.4 F)      Allergies    No Known Allergies    Intolerances        I&O's Summary    I & Os for current day (as of 02 Apr 2017 18:46)  =============================================  IN: 200 ml / OUT: 0 ml / NET: 200 ml        REVIEW OF SYSTEMS:    CONSTITUTIONAL:  As per HPI.    HEENT:  Eyes:  No diplopia or blurred vision. ENT:  No earache, sore throat or runny nose.    CARDIOVASCULAR:  No pressure, squeezing, strangling, tightness, heaviness or aching about the chest, neck, axilla or epigastrium.    RESPIRATORY:  No cough, shortness of breath, PND or orthopnea.    GASTROINTESTINAL:  No nausea, vomiting or diarrhea.    GENITOURINARY:  No dysuria, frequency or urgency.    MUSCULOSKELETAL:  As per HPI.    SKIN:  No change in skin, hair or nails.    NEUROLOGIC:  No paresthesias, fasciculations, seizures or weakness.    PSYCHIATRIC:  No disorder of thought or mood.    ENDOCRINE:  No heat or cold intolerance, polyuria or polydipsia.    HEMATOLOGICAL:  No easy bruising or bleeding.        Vital Signs Last 24 Hrs  T(C): 36.6, Max: 37.2 (04-01 @ 19:51)  T(F): 97.9, Max: 99 (04-01 @ 19:51)  HR: 89 (72 - 89)  BP: 126/30 (126/30 - 149/41)  BP(mean): --  RR: 17 (17 - 18)  SpO2: 100% (96% - 100%)  Daily     Daily     PHYSICAL EXAM:    General:  Alert, well-developed ,No acute distress.    Neuro:  Alert and oriented to person, place, and time. Able to communicate  well.  Appropriate affect.     HEENT:  No JVD, no masses, Eyes anicteric, No carotid bruits. No lymphadenopathy,    Cardiovascular:  Regular rate and rhythm, with normal S1 and S2. No murmurs, rubs,  or gallops. No JVD.    Lungs:  Lungs clear. no rales, no wheezing, .    Abdomen:  Normoactive bowel sounds. Soft, flat, non-tender, and non-distended.  No hepatosplenomegaly, positive bowel sounds..    Skin:  Warm, dry, well-perfused. No rashes or other lesions.     Extremities:  edema improving wrapped    LABS:                        8.6    x     )-----------( x        ( 02 Apr 2017 15:33 )             26.4     02 Apr 2017 05:52    150    |  115    |  69     ----------------------------<  113    3.6     |  24     |  1.91     Ca    7.7        02 Apr 2017 05:52 no

## 2019-02-27 NOTE — PROGRESS NOTE ADULT - ASSESSMENT
pt with    nsaid  induced  gastritis  by history/  c/c  back pain/ obesity     now  admitted with fevers/ vomiting/ profuse diarrhea, with bloody  diarrhea  at times,    and abdominal pain  in er, with elevated wbc/  tachycardia    ct  abdomen  descending colitis, likely UC.    gi  eval called.    on  zosyn/   zofran  npo  dvt ppx/   labs in am  will need  colonoscopy after discharge. GI consult called.   Trial of clears.   family  at  bedside

## 2019-02-27 NOTE — CONSULT NOTE ADULT - SUBJECTIVE AND OBJECTIVE BOX
Patient is a 23y old  Male who presents with a chief complaint of     HPI:  High Risk Travel:  International Travel? No(1)    23M suspected dx of sacroiliitis and ?UC, p/w worsening diarrhea over the past 3 days 15-20 episodes daily intermittently bloody. associated with this is fevers (tmax 102) and bilious vomiting (green) and epigastric abdominal pain. 15 lb weight loss in the last 2 weeks. 	  He has remote EGD/colonoscopy in 7th grade for severe constipation - stool holding, no acute pathology on EGD or colonoscopy reported.  Subsequently in /[facility] developed low back/hip/leg pain for which he took Alleve BID, had associated "stomach upset" and intolerance to certain foods.  During the past 1.5 years, had worsening back/hip/leg pain, was seen by Rheum and work-up initiated (labs and MRI)  findings suspicious for sacroiliitis and was placed on NAproxen.  Developed worsening GI upset and then developed diarrhea, with occasional rectal bleeding (small amounts) also occasional nausea/vomiting (non-bloody, gastric contents and occasionally yellow/bilious).    Worsening diarrhea, associated fever and weight loss (20 pounds within past month)  C diff negative as outpt (copy of labs obtained from pt), elevated CRP, ESR. Negative Lyme serologies. Negative Hep C AB    Started on Prednisone 40 mg PO daily (took one dose and reported feeling "better") but was advised to stop on office visit the following day at primary GI (Peds GI Dr Suarez) and advised to come to ER for further evaluation  Reports IBD serologies sent to [email or web address] labs earlier this week    NO recent EGD or colonoscopy  no rectal pain or pressure, no tenesmus  +family history of UC - maternal cousin         Stools today - 6-8 episodes of yellow to green liquid stool with some mucus, no allan blood    PAST MEDICAL & SURGICAL HISTORY:  No pertinent past medical history  No significant past surgical history      Allergies  No Known Allergies      MEDICATIONS  (STANDING):  heparin  Injectable 5000 Unit(s) SubCutaneous every 12 hours  influenza   Vaccine 0.5 milliLiter(s) IntraMuscular once  lactobacillus acidophilus 1 Tablet(s) Oral three times a day with meals  pantoprazole    Tablet 40 milliGRAM(s) Oral before breakfast  piperacillin/tazobactam IVPB. 3.375 Gram(s) IV Intermittent every 8 hours  sodium chloride 0.9%. 1000 milliLiter(s) (80 mL/Hr) IV Continuous <Continuous>    MEDICATIONS  (PRN):  ondansetron Injectable 4 milliGRAM(s) IV Push every 6 hours PRN Nausea and/or Vomiting      Social History:    Marital Status:  (   )    ( X  ) Single - engaged   (   )    (  )     Substance Use (street drugs): ( X ) never used  (  ) other:  Tobacco Usage:  (  X ) never smoked   (   ) former smoker   (   ) current smoker  (     ) pack year  (        ) last cigarette date  Alcohol Usage: social ETOH      Family History   IBD (X  ) Yes - maternal cousin  (  ) No  GI Malignancy (  )  Yes    ( X ) No    Health Management  Last Colonoscopy - age ~12      Advanced Directives: (  X   ) None    (      ) DNR    (     ) DNI    (     ) Health Care Proxy:     Review of Systems:    General:  +wt loss, fevers ( see HPI), No  night sweats   CV:  No pain, palpitations, hypo/hypertension  Resp:  No dyspnea, cough, tachypnea, wheezing  GI:see HPI  :  No pain, bleeding, incontinence, nocturia  Muscle: see HPI  Neuro:  No weakness, tingling, memory problems  Psych:  No fatigue, insomnia, mood problems, depression  Endocrine:  No polyuria, polydypsia, cold/heat intolerance  Heme:  No petechiae, ecchymosis, easy bruisability  Skin:  No rash, tattoos, scars, edema      Vital Signs Last 24 Hrs  T(C): 36.4 (27 Feb 2019 08:45), Max: 37 (26 Feb 2019 21:28)  T(F): 97.6 (27 Feb 2019 08:45), Max: 98.6 (26 Feb 2019 21:28)  HR: 75 (27 Feb 2019 08:45) (74 - 105)  BP: 113/70 (27 Feb 2019 08:45) (110/70 - 139/77)  BP(mean): --  RR: 18 (27 Feb 2019 08:45) (16 - 18)  SpO2: 97% (27 Feb 2019 08:45) (97% - 100%)    PHYSICAL EXAM:    Constitutional: NAD, non toxic appearing, well-developed pleasant WM OOB to chair. family at bedside  Neck: No LAD, supple no JVD  Respiratory: grossly clear  Cardiovascular: S1 and S2, RRR, no M  Gastrointestinal: BS+, obese soft, ND +epigastric and mid-lower abdominal tenderness without rebound, guarding or rigidity neg HSM  Extremities: No peripheral edema, neg clubbing, cyanosis  NO palpable nodules on shins anteriorly  Vascular: 2+ peripheral pulses  Neurological: A/O x 3, no focal deficits  Psychiatric: Normal mood, normal affect  Skin: No rashes, no rash. no nail pitting        LABS:                        12.2   15.6  )-----------( 653      ( 26 Feb 2019 18:39 )             36.3     Mean Cell Volume: 72.4 fl (02.26.19 @ 18:39)      02-27    139  |  102  |  7   ----------------------------<  95  3.5   |  22  |  0.90    Ca    9.0      27 Feb 2019 10:37  Phos  3.9     02-27  Mg     2.3     02-27    TPro  8.7<H>  /  Alb  4.1  /  TBili  0.3  /  DBili  x   /  AST  16  /  ALT  20  /  AlkPhos  75  02-26      Urinalysis Basic - ( 26 Feb 2019 20:25 )    Color: Light Yellow / Appearance: Clear / SG: >1.050 / pH: x  Gluc: x / Ketone: Trace  / Bili: Negative / Urobili: Negative   Blood: x / Protein: 30 mg/dL / Nitrite: Negative   Leuk Esterase: Negative / RBC: 2 /hpf / WBC 5 /HPF   Sq Epi: x / Non Sq Epi: 3 /hpf / Bacteria: Negative        RADIOLOGY & ADDITIONAL TESTS:  < from: CT Abdomen and Pelvis w/ IV Cont (02.26.19 @ 18:54) >  FINDINGS:    LOWER CHEST: Within normal limits.    LIVER: Focal hepatic fatty infiltration at the falciform ligament are   unchanged since August 10, 2018 chest CT.  BILE DUCTS: Normal caliber.  GALLBLADDER: Within normal limits.  SPLEEN: Within normal limits.  PANCREAS: Within normal limits.  ADRENALS: Within normal limits.  KIDNEYS/URETERS: Within normal limits.    BLADDER: Within normal limits.  REPRODUCTIVE ORGANS: Prostate within normal limits.    BOWEL: Mild wall thickening involving the rectum and possibly portions of   the sigmoid colon. The descending colon may also be thickened although   evaluation is limited due to under distention. There is wall thickening   involving the splenic flecture. No bowel obstruction. Appendix not   visualized.  PERITONEUM: No ascites.  VESSELS:  Within normal limits.  RETROPERITONEUM: There are numerous mesenteric small and mildly enlarged   lymph nodes measuring up to 1.3 cm.  ABDOMINAL WALL: Within normal limits.  BONES: Symmetric bilateral sacroiliitis. Lumbar scoliosis. Endplate   sclerosis is noted at the L5-S1 disc space.    IMPRESSION: Areas of colonic wall thickening may be of infectious or   inflammatory etiology. Numerous small and mildly enlarged mesenteric   lymph nodes may be reactive however, follow-up to resolution is   recommended to exclude neoplastic etiology.    Bilateral symmetric sacroiliitis can be seen in the setting of ulcerative   colitis. Patient is a 23y old  Male who presents with a chief complaint of diarrhea, rectal bleeding    HPI:  High Risk Travel:  International Travel? No(1)    23M suspected dx of sacroiliitis and ?UC, p/w worsening diarrhea over the past 3 days 15-20 episodes daily intermittently bloody. associated with this is fevers (tmax 102) and bilious vomiting (green) and epigastric abdominal pain. 15 lb weight loss in the last 2 weeks. 	  He has remote EGD/colonoscopy in 7th grade for severe constipation - stool holding, no acute pathology on EGD or colonoscopy reported.  Subsequently in /[facility] developed low back/hip/leg pain for which he took Alleve BID, had associated "stomach upset" and intolerance to certain foods.  During the past 1.5 years, had worsening back/hip/leg pain, was seen by Rheum and work-up initiated (labs and MRI)  findings suspicious for sacroiliitis and was placed on NAproxen.  Developed worsening GI upset and then developed diarrhea, with occasional rectal bleeding (small amounts) also occasional nausea/vomiting (non-bloody, gastric contents and occasionally yellow/bilious).    Worsening diarrhea, associated fever and weight loss (20 pounds within past month)  C diff negative as outpt (copy of labs obtained from pt), elevated CRP, ESR. Negative Lyme serologies. Negative Hep C AB    Started on Prednisone 40 mg PO daily (took one dose and reported feeling "better") but was advised to stop on office visit the following day at primary GI (Peds GI Dr Suarez) and advised to come to ER for further evaluation  Reports IBD serologies sent to [facility] labs earlier this week    NO recent EGD or colonoscopy  no rectal pain or pressure, no tenesmus  +family history of UC - maternal cousin         Stools today - 6-8 episodes of yellow to green liquid stool with some mucus, no allan blood    PAST MEDICAL & SURGICAL HISTORY:  No pertinent past medical history  No significant past surgical history      Allergies  No Known Allergies      MEDICATIONS  (STANDING):  heparin  Injectable 5000 Unit(s) SubCutaneous every 12 hours  influenza   Vaccine 0.5 milliLiter(s) IntraMuscular once  lactobacillus acidophilus 1 Tablet(s) Oral three times a day with meals  pantoprazole    Tablet 40 milliGRAM(s) Oral before breakfast  piperacillin/tazobactam IVPB. 3.375 Gram(s) IV Intermittent every 8 hours  sodium chloride 0.9%. 1000 milliLiter(s) (80 mL/Hr) IV Continuous <Continuous>    MEDICATIONS  (PRN):  ondansetron Injectable 4 milliGRAM(s) IV Push every 6 hours PRN Nausea and/or Vomiting      Social History:    Marital Status:  (   )    ( X  ) Single - engaged   (   )    (  )     Substance Use (street drugs): ( X ) never used  (  ) other:  Tobacco Usage:  (  X ) never smoked   (   ) former smoker   (   ) current smoker  (     ) pack year  (        ) last cigarette date  Alcohol Usage: social ETOH      Family History   IBD (X  ) Yes - maternal cousin  (  ) No  GI Malignancy (  )  Yes    ( X ) No    Health Management  Last Colonoscopy - age ~12      Advanced Directives: (  X   ) None    (      ) DNR    (     ) DNI    (     ) Health Care Proxy:     Review of Systems:    General:  +wt loss, fevers ( see HPI), No  night sweats   CV:  No pain, palpitations, hypo/hypertension  Resp:  No dyspnea, cough, tachypnea, wheezing  GI:see HPI  :  No pain, bleeding, incontinence, nocturia  Muscle: see HPI  Neuro:  No weakness, tingling, memory problems  Psych:  No fatigue, insomnia, mood problems, depression  Endocrine:  No polyuria, polydypsia, cold/heat intolerance  Heme:  No petechiae, ecchymosis, easy bruisability  Skin:  No rash, tattoos, scars, edema      Vital Signs Last 24 Hrs  T(C): 36.4 (27 Feb 2019 08:45), Max: 37 (26 Feb 2019 21:28)  T(F): 97.6 (27 Feb 2019 08:45), Max: 98.6 (26 Feb 2019 21:28)  HR: 75 (27 Feb 2019 08:45) (74 - 105)  BP: 113/70 (27 Feb 2019 08:45) (110/70 - 139/77)  BP(mean): --  RR: 18 (27 Feb 2019 08:45) (16 - 18)  SpO2: 97% (27 Feb 2019 08:45) (97% - 100%)    PHYSICAL EXAM:    Constitutional: NAD, non toxic appearing, well-developed pleasant WM OOB to chair. family at bedside  Neck: No LAD, supple no JVD  Respiratory: grossly clear  Cardiovascular: S1 and S2, RRR, no M  Gastrointestinal: BS+, obese soft, ND +epigastric and mid-lower abdominal tenderness without rebound, guarding or rigidity neg HSM  Extremities: No peripheral edema, neg clubbing, cyanosis  NO palpable nodules on shins anteriorly  Vascular: 2+ peripheral pulses  Neurological: A/O x 3, no focal deficits  Psychiatric: Normal mood, normal affect  Skin: No rashes, no rash. no nail pitting        LABS:                        12.2   15.6  )-----------( 653      ( 26 Feb 2019 18:39 )             36.3     Mean Cell Volume: 72.4 fl (02.26.19 @ 18:39)      02-27    139  |  102  |  7   ----------------------------<  95  3.5   |  22  |  0.90    Ca    9.0      27 Feb 2019 10:37  Phos  3.9     02-27  Mg     2.3     02-27    TPro  8.7<H>  /  Alb  4.1  /  TBili  0.3  /  DBili  x   /  AST  16  /  ALT  20  /  AlkPhos  75  02-26      Urinalysis Basic - ( 26 Feb 2019 20:25 )    Color: Light Yellow / Appearance: Clear / SG: >1.050 / pH: x  Gluc: x / Ketone: Trace  / Bili: Negative / Urobili: Negative   Blood: x / Protein: 30 mg/dL / Nitrite: Negative   Leuk Esterase: Negative / RBC: 2 /hpf / WBC 5 /HPF   Sq Epi: x / Non Sq Epi: 3 /hpf / Bacteria: Negative        RADIOLOGY & ADDITIONAL TESTS:  < from: CT Abdomen and Pelvis w/ IV Cont (02.26.19 @ 18:54) >  FINDINGS:    LOWER CHEST: Within normal limits.    LIVER: Focal hepatic fatty infiltration at the falciform ligament are   unchanged since August 10, 2018 chest CT.  BILE DUCTS: Normal caliber.  GALLBLADDER: Within normal limits.  SPLEEN: Within normal limits.  PANCREAS: Within normal limits.  ADRENALS: Within normal limits.  KIDNEYS/URETERS: Within normal limits.    BLADDER: Within normal limits.  REPRODUCTIVE ORGANS: Prostate within normal limits.    BOWEL: Mild wall thickening involving the rectum and possibly portions of   the sigmoid colon. The descending colon may also be thickened although   evaluation is limited due to under distention. There is wall thickening   involving the splenic flecture. No bowel obstruction. Appendix not   visualized.  PERITONEUM: No ascites.  VESSELS:  Within normal limits.  RETROPERITONEUM: There are numerous mesenteric small and mildly enlarged   lymph nodes measuring up to 1.3 cm.  ABDOMINAL WALL: Within normal limits.  BONES: Symmetric bilateral sacroiliitis. Lumbar scoliosis. Endplate   sclerosis is noted at the L5-S1 disc space.    IMPRESSION: Areas of colonic wall thickening may be of infectious or   inflammatory etiology. Numerous small and mildly enlarged mesenteric   lymph nodes may be reactive however, follow-up to resolution is   recommended to exclude neoplastic etiology.    Bilateral symmetric sacroiliitis can be seen in the setting of ulcerative   colitis.

## 2019-02-28 ENCOUNTER — RESULT REVIEW (OUTPATIENT)
Age: 24
End: 2019-02-28

## 2019-02-28 PROCEDURE — 43239 EGD BIOPSY SINGLE/MULTIPLE: CPT

## 2019-02-28 PROCEDURE — 88305 TISSUE EXAM BY PATHOLOGIST: CPT | Mod: 26

## 2019-02-28 PROCEDURE — 88312 SPECIAL STAINS GROUP 1: CPT | Mod: 26

## 2019-02-28 PROCEDURE — 45380 COLONOSCOPY AND BIOPSY: CPT

## 2019-02-28 RX ORDER — MESALAMINE 400 MG
800 TABLET, DELAYED RELEASE (ENTERIC COATED) ORAL THREE TIMES A DAY
Qty: 0 | Refills: 0 | Status: DISCONTINUED | OUTPATIENT
Start: 2019-02-28 | End: 2019-03-01

## 2019-02-28 RX ORDER — LANOLIN ALCOHOL/MO/W.PET/CERES
3 CREAM (GRAM) TOPICAL ONCE
Qty: 0 | Refills: 0 | Status: COMPLETED | OUTPATIENT
Start: 2019-02-28 | End: 2019-02-28

## 2019-02-28 RX ORDER — MESALAMINE 400 MG
4 TABLET, DELAYED RELEASE (ENTERIC COATED) ORAL AT BEDTIME
Qty: 0 | Refills: 0 | Status: DISCONTINUED | OUTPATIENT
Start: 2019-02-28 | End: 2019-03-01

## 2019-02-28 RX ADMIN — Medication 4 GRAM(S): at 22:24

## 2019-02-28 RX ADMIN — Medication 1 TABLET(S): at 17:48

## 2019-02-28 RX ADMIN — Medication 800 MILLIGRAM(S): at 22:24

## 2019-02-28 RX ADMIN — Medication 1000 MILLILITER(S): at 05:53

## 2019-02-28 RX ADMIN — PIPERACILLIN AND TAZOBACTAM 25 GRAM(S): 4; .5 INJECTION, POWDER, LYOPHILIZED, FOR SOLUTION INTRAVENOUS at 05:51

## 2019-02-28 RX ADMIN — Medication 3 MILLIGRAM(S): at 23:11

## 2019-02-28 RX ADMIN — PANTOPRAZOLE SODIUM 40 MILLIGRAM(S): 20 TABLET, DELAYED RELEASE ORAL at 05:51

## 2019-02-28 RX ADMIN — ONDANSETRON 4 MILLIGRAM(S): 8 TABLET, FILM COATED ORAL at 06:17

## 2019-02-28 NOTE — PROVIDER CONTACT NOTE (MEDICATION) - ASSESSMENT
pt a&ox4, took first dose of MoviPrep at 1800 2/27/19 as ordered   as per order "1 liter at 6pm, complete over 1 hour  2ns liter at 6am 2/18/19, complete over 1 hour"

## 2019-02-28 NOTE — PROGRESS NOTE ADULT - ASSESSMENT
23 year old male with history of 20 pound weight loss with abdominal pain, diarrhea, hematochezia, fever and new findings of colitis on CT (Left sided) with concern for possible new Dx UC as well as b/l sacroiliits. Also with long term NSAIDs use.    RECS:  -NPO except meds for EGD/Colonoscopy at 2pm today  -PPI  -can continue Zosyn for now  -Hold off on steroids until endoscopic evaluation  -await Hep B serologies and Quant. Gold serologies in case pt may ultimately require biologic Rx for treatment    Discussed with patient and family at bedside, all questions answered    Toan Mata PA-C    [facility]  [phone]

## 2019-02-28 NOTE — PRE-ANESTHESIA EVALUATION ADULT - NSANTHOSAYNRD_GEN_A_CORE
No. AMI screening performed.  STOP BANG Legend: 0-2 = LOW Risk; 3-4 = INTERMEDIATE Risk; 5-8 = HIGH Risk

## 2019-02-28 NOTE — PROVIDER CONTACT NOTE (MEDICATION) - ACTION/TREATMENT ORDERED:
as per provider administer 2nd dose of MoviPrep at 0600 2/28/19 as **date of 2/18/19 was typo.  continue with NPO except medications

## 2019-03-01 ENCOUNTER — TRANSCRIPTION ENCOUNTER (OUTPATIENT)
Age: 24
End: 2019-03-01

## 2019-03-01 VITALS
DIASTOLIC BLOOD PRESSURE: 69 MMHG | RESPIRATION RATE: 18 BRPM | SYSTOLIC BLOOD PRESSURE: 114 MMHG | HEART RATE: 74 BPM | TEMPERATURE: 98 F | OXYGEN SATURATION: 97 %

## 2019-03-01 PROCEDURE — 83735 ASSAY OF MAGNESIUM: CPT

## 2019-03-01 PROCEDURE — 83690 ASSAY OF LIPASE: CPT

## 2019-03-01 PROCEDURE — 82947 ASSAY GLUCOSE BLOOD QUANT: CPT

## 2019-03-01 PROCEDURE — 99285 EMERGENCY DEPT VISIT HI MDM: CPT | Mod: 25

## 2019-03-01 PROCEDURE — 82330 ASSAY OF CALCIUM: CPT

## 2019-03-01 PROCEDURE — 83605 ASSAY OF LACTIC ACID: CPT

## 2019-03-01 PROCEDURE — 85014 HEMATOCRIT: CPT

## 2019-03-01 PROCEDURE — 82803 BLOOD GASES ANY COMBINATION: CPT

## 2019-03-01 PROCEDURE — 99232 SBSQ HOSP IP/OBS MODERATE 35: CPT

## 2019-03-01 PROCEDURE — 96374 THER/PROPH/DIAG INJ IV PUSH: CPT | Mod: XU

## 2019-03-01 PROCEDURE — 88312 SPECIAL STAINS GROUP 1: CPT

## 2019-03-01 PROCEDURE — 81001 URINALYSIS AUTO W/SCOPE: CPT

## 2019-03-01 PROCEDURE — 85027 COMPLETE CBC AUTOMATED: CPT

## 2019-03-01 PROCEDURE — 84100 ASSAY OF PHOSPHORUS: CPT

## 2019-03-01 PROCEDURE — 87040 BLOOD CULTURE FOR BACTERIA: CPT

## 2019-03-01 PROCEDURE — 84295 ASSAY OF SERUM SODIUM: CPT

## 2019-03-01 PROCEDURE — 74177 CT ABD & PELVIS W/CONTRAST: CPT

## 2019-03-01 PROCEDURE — 84132 ASSAY OF SERUM POTASSIUM: CPT

## 2019-03-01 PROCEDURE — 80053 COMPREHEN METABOLIC PANEL: CPT

## 2019-03-01 PROCEDURE — 87086 URINE CULTURE/COLONY COUNT: CPT

## 2019-03-01 PROCEDURE — 82435 ASSAY OF BLOOD CHLORIDE: CPT

## 2019-03-01 PROCEDURE — 96375 TX/PRO/DX INJ NEW DRUG ADDON: CPT

## 2019-03-01 PROCEDURE — 88305 TISSUE EXAM BY PATHOLOGIST: CPT

## 2019-03-01 PROCEDURE — 80048 BASIC METABOLIC PNL TOTAL CA: CPT

## 2019-03-01 RX ORDER — MESALAMINE 400 MG
60 TABLET, DELAYED RELEASE (ENTERIC COATED) ORAL
Qty: 0 | Refills: 0 | COMMUNITY
Start: 2019-03-01

## 2019-03-01 RX ORDER — ACETAMINOPHEN 500 MG
0 TABLET ORAL
Qty: 0 | Refills: 0 | COMMUNITY

## 2019-03-01 RX ORDER — MESALAMINE 400 MG
2 TABLET, DELAYED RELEASE (ENTERIC COATED) ORAL
Qty: 0 | Refills: 0 | COMMUNITY
Start: 2019-03-01

## 2019-03-01 RX ORDER — PANTOPRAZOLE SODIUM 20 MG/1
1 TABLET, DELAYED RELEASE ORAL
Qty: 0 | Refills: 0 | COMMUNITY
Start: 2019-03-01

## 2019-03-01 RX ORDER — LACTOBACILLUS ACIDOPHILUS 100MM CELL
1 CAPSULE ORAL
Qty: 28 | Refills: 0 | OUTPATIENT
Start: 2019-03-01 | End: 2019-03-14

## 2019-03-01 RX ORDER — FAMOTIDINE 10 MG/ML
1 INJECTION INTRAVENOUS
Qty: 14 | Refills: 0 | OUTPATIENT
Start: 2019-03-01 | End: 2019-03-14

## 2019-03-01 RX ORDER — MESALAMINE 400 MG
2 TABLET, DELAYED RELEASE (ENTERIC COATED) ORAL
Qty: 240 | Refills: 0 | OUTPATIENT
Start: 2019-03-01 | End: 2019-03-30

## 2019-03-01 RX ORDER — FAMOTIDINE 10 MG/ML
20 INJECTION INTRAVENOUS AT BEDTIME
Qty: 0 | Refills: 0 | Status: DISCONTINUED | OUTPATIENT
Start: 2019-03-01 | End: 2019-03-01

## 2019-03-01 RX ORDER — BENZOCAINE AND MENTHOL 5; 1 G/100ML; G/100ML
1 LIQUID ORAL THREE TIMES A DAY
Qty: 0 | Refills: 0 | Status: DISCONTINUED | OUTPATIENT
Start: 2019-03-01 | End: 2019-03-01

## 2019-03-01 RX ORDER — ACETAMINOPHEN WITH CODEINE 300MG-30MG
1 TABLET ORAL EVERY 12 HOURS
Qty: 0 | Refills: 0 | Status: DISCONTINUED | OUTPATIENT
Start: 2019-03-01 | End: 2019-03-01

## 2019-03-01 RX ORDER — ACETAMINOPHEN WITH CODEINE 300MG-30MG
1 TABLET ORAL
Qty: 4 | Refills: 0 | OUTPATIENT
Start: 2019-03-01 | End: 2019-03-02

## 2019-03-01 RX ORDER — MESALAMINE 400 MG
60 TABLET, DELAYED RELEASE (ENTERIC COATED) ORAL
Qty: 120 | Refills: 0 | OUTPATIENT
Start: 2019-03-01 | End: 2019-03-30

## 2019-03-01 RX ORDER — PANTOPRAZOLE SODIUM 20 MG/1
1 TABLET, DELAYED RELEASE ORAL
Qty: 30 | Refills: 0 | OUTPATIENT
Start: 2019-03-01 | End: 2019-03-30

## 2019-03-01 RX ORDER — BENZOCAINE AND MENTHOL 5; 1 G/100ML; G/100ML
1 LIQUID ORAL
Qty: 6 | Refills: 0 | OUTPATIENT
Start: 2019-03-01 | End: 2019-03-02

## 2019-03-01 RX ORDER — MESALAMINE 400 MG
2 TABLET, DELAYED RELEASE (ENTERIC COATED) ORAL
Qty: 180 | Refills: 0 | OUTPATIENT
Start: 2019-03-01 | End: 2019-03-30

## 2019-03-01 RX ADMIN — Medication 1 TABLET(S): at 12:19

## 2019-03-01 RX ADMIN — Medication 1 TABLET(S): at 09:27

## 2019-03-01 RX ADMIN — SODIUM CHLORIDE 80 MILLILITER(S): 9 INJECTION INTRAMUSCULAR; INTRAVENOUS; SUBCUTANEOUS at 00:56

## 2019-03-01 RX ADMIN — BENZOCAINE AND MENTHOL 1 LOZENGE: 5; 1 LIQUID ORAL at 12:19

## 2019-03-01 RX ADMIN — PANTOPRAZOLE SODIUM 40 MILLIGRAM(S): 20 TABLET, DELAYED RELEASE ORAL at 05:58

## 2019-03-01 RX ADMIN — Medication 800 MILLIGRAM(S): at 05:58

## 2019-03-01 NOTE — DISCHARGE NOTE ADULT - PATIENT PORTAL LINK FT
You can access the [facility] Patient Portal, offered by [facility], by registering with the following website: [email or web address]

## 2019-03-01 NOTE — PROGRESS NOTE ADULT - ASSESSMENT
23 year old male with history of 20 pound weight loss with abdominal pain, diarrhea, hematochezia, fever and new findings of colitis on CT (Left sided) with concern for possible new Dx UC as well as b/l sacroiliits. Also with long term NSAIDs use.    EGD 2/28/19 - gastritis (biopsied), duodenitis- first portion (biopsied)  Colonoscopy 2/28/19 - diffuee colitis distal transverse colon/splenic flexure to rectum, TI and cecum WNL (multiple biopsies taken)    Zosyn d/c'ed 2/28/19 pm    RECS:  -PO LRD  -PPI daily  -PO probiotic  -Delzicol 800 mg PO TID  -Rowasa enema [place] QHS  -Results of outpt.  Hep B serologies and Quant. Gold serologies (in case pt may require biologic Rx in future for treatment) being sent to office by pt (had drawn at outpt lab from [place])  -Followed by Rheumatology as outpt, no NSAIDS.  Has scheduled Rheum. appt on 3/12/19 per pt/family    No GI objection to discharge home today  Has scheduled outpt appt with Dr Villar on 3/12/19; follow up pathology results and clinical progress  Discussed with patient and family at bedside, all questions answered  Call placed to discuss with PMD  Medicine NP updated    Toan Mata PA-C    [facility]  [phone] 23 year old male with history of 20 pound weight loss with abdominal pain, diarrhea, hematochezia, fever and new findings of colitis on CT (Left sided) with concern for possible new Dx UC as well as b/l sacroiliits. Also with long term NSAIDs use.    EGD 2/28/19 - gastritis (biopsied), duodenitis- first portion (biopsied)  Colonoscopy 2/28/19 - diffuse colitis distal transverse colon/splenic flexure to rectum, TI and cecum WNL (multiple biopsies taken)    Zosyn d/c'ed 2/28/19 pm    RECS:  -PO LRD  -PPI daily  -PO probiotic  -Delzicol 800 mg PO TID  -Rowasa enema [place] QHS  -Results of outpt.  Hep B serologies and Quant. Gold serologies (in case pt may require biologic Rx in future for treatment) being sent to office by pt (had drawn at outpt lab from [place])  -Followed by Rheumatology as outpt, no NSAIDS.  Has scheduled Rheum. appt on 3/12/19 per pt/family    No GI objection to discharge home today  Has scheduled outpt appt with Dr Villar on 3/12/19; follow up pathology results and clinical progress  Discussed with patient and family at bedside, all questions answered  Call placed to discuss with PMD  Medicine NP updated    Toan Mata PA-C    [facility]  [phone]

## 2019-03-01 NOTE — PHARMACOTHERAPY INTERVENTION NOTE - COMMENTS
as per [facility] pharmacist - delzicol not covered by patient's insurance. insurance covers apriso or pentasa - informed med NP
confirmed coverage of pentasa w/ [facility] pharmacy - $20 copay

## 2019-03-01 NOTE — DISCHARGE NOTE ADULT - HOSPITAL COURSE
23 year old male with history of 20 pound weight loss with abdominal pain, diarrhea, hematochezia, fever and new findings of colitis on CT (Left sided) with concern for possible new Dx UC as well as b/l sacroiliits. Also with long term NSAIDs use.  Gastroenterologist evaluated, Results of outpt.  Hep B serologies and Quant. Gold serologies (in case pt may require biologic Rx in future for treatment) being sent to office by pt (had drawn at outpt lab from [place])  -Followed by Rheumatology as outpt, no NSAIDS.  Has scheduled Rheumatology appt on 3/12/19 per pt/family  Gastroenterology cleared Pt for discharge today.   Has scheduled outpt appt with Dr Villar on 3/12/19; follow up pathology results and clinical progress  Discussed with patient and family at bedside, all questions answered  Pt is seen by Medicine attending and gastroenterologist, vital signs stable, tolerating diet, cleared for discharge today.

## 2019-03-01 NOTE — DISCHARGE NOTE ADULT - CARE PLAN
Principal Discharge DX:	Rectal bleeding  Secondary Diagnosis:	Vomiting and diarrhea Principal Discharge DX:	Rectal bleeding  Assessment and plan of treatment:	Has scheduled outpt appt with Dr Villar on 3/12/19; follow up pathology results and clinical progress  Secondary Diagnosis:	Vomiting and diarrhea Principal Discharge DX:	Rectal bleeding  Goal:	UC as well as b/l sacroiliits. Also with long term NSAIDs use.  Assessment and plan of treatment:	s/p EGD 2/28/19 - gastritis (biopsied), duodenitis- first portion (biopsied)  Colonoscopy 2/28/19 - diffuee colitis distal transverse colon/splenic flexure to rectum, TI and cecum WNL (multiple biopsies taken). Zosyn d/c'ed 2/28/19 pm  low residue diet  -continue protonix, probiotic  Delzicol 800 mg PO TID  -Rowasa enema [place] QHS  Has scheduled outpt appt with Dr Villar on 3/12/19; follow up pathology results and clinical progress  Secondary Diagnosis:	Vomiting and diarrhea  Goal:	resolved

## 2019-03-01 NOTE — DISCHARGE NOTE ADULT - PLAN OF CARE
Has scheduled outpt appt with Dr Villar on 3/12/19; follow up pathology results and clinical progress UC as well as b/l sacroiliits. Also with long term NSAIDs use. s/p EGD 2/28/19 - gastritis (biopsied), duodenitis- first portion (biopsied)  Colonoscopy 2/28/19 - diffuee colitis distal transverse colon/splenic flexure to rectum, TI and cecum WNL (multiple biopsies taken). Zosyn d/c'ed 2/28/19 pm  low residue diet  -continue protonix, probiotic  Delzicol 800 mg PO TID  -Rowasa enema [place] QHS  Has scheduled outpt appt with Dr Villar on 3/12/19; follow up pathology results and clinical progress resolved

## 2019-03-01 NOTE — DISCHARGE NOTE ADULT - MEDICATION SUMMARY - MEDICATIONS TO TAKE
I will START or STAY ON the medications listed below when I get home from the hospital:    mesalamine 400 mg oral delayed release capsule  -- 2 cap(s) by mouth 3 times a day  -- Indication: For IBD    mesalamine 4 g/60 mL rectal enema  -- 60 milliliter(s) rectally once a day (at bedtime)  -- Indication: For IBD    ProAir HFA 90 mcg/inh inhalation aerosol  -- 2 puff(s) inhaled 4 times a day, As Needed  -- Indication: For WHEEZING    pantoprazole 40 mg oral delayed release tablet  -- 1 tab(s) by mouth once a day (before a meal)  -- Indication: For Gastroenterological maintenance I will START or STAY ON the medications listed below when I get home from the hospital:    mesalamine 400 mg oral delayed release capsule  -- 2 cap(s) by mouth 3 times a day  -- Indication: For ulecerative colitis    mesalamine 4 g/60 mL rectal enema  -- 60 milliliter(s) rectally once a day (at bedtime)  -- Indication: For ulecerative colitis    ProAir HFA 90 mcg/inh inhalation aerosol  -- 2 puff(s) inhaled 4 times a day, As Needed  -- Indication: For WHEEZING    lactobacillus acidophilus oral capsule  -- 1 cap(s) by mouth 2 times a day   -- Indication: For Gastroenterological maintenance    pantoprazole 40 mg oral delayed release tablet  -- 1 tab(s) by mouth once a day (before a meal)  -- Indication: For Gastroenterological maintenance I will START or STAY ON the medications listed below when I get home from the hospital:    mesalamine 4 g/60 mL rectal enema  -- 60 milliliter(s) rectally once a day (at bedtime)  -- Indication: For ulecerative colitis    Pentasa 500 mg oral capsule, extended release  -- 2 cap(s) by mouth 4 times a day   -- Swallow whole.  Do not crush.    -- Indication: For ulcerative colitis    ProAir HFA 90 mcg/inh inhalation aerosol  -- 2 puff(s) inhaled 4 times a day, As Needed  -- Indication: For WHEEZING    lactobacillus acidophilus oral capsule  -- 1 cap(s) by mouth 2 times a day   -- Indication: For Gastroenterological maintenance    pantoprazole 40 mg oral delayed release tablet  -- 1 tab(s) by mouth once a day (before a meal)  -- Indication: For Gastroenterological maintenance I will START or STAY ON the medications listed below when I get home from the hospital:    mesalamine 4 g/60 mL rectal enema  -- 60 milliliter(s) rectally once a day (at bedtime)  -- Indication: For ulecerative colitis    Pentasa 500 mg oral capsule, extended release  -- 2 cap(s) by mouth 4 times a day   -- Swallow whole.  Do not crush.    -- Indication: For ulcerative colitis    Tylenol with Codeine #3 oral tablet  -- 1 tab(s) by mouth every 12 hours MDD:2 for severe pain as needed  -- Caution federal law prohibits the transfer of this drug to any person other  than the person for whom it was prescribed.  May cause drowsiness.  Alcohol may intensify this effect.  Use care when operating dangerous machinery.  This product contains acetaminophen.  Do not use  with any other product containing acetaminophen to prevent possible liver damage.  Using more of this medication than prescribed may cause serious breathing problems.    -- Indication: For severe pain    ProAir HFA 90 mcg/inh inhalation aerosol  -- 2 puff(s) inhaled 4 times a day, As Needed  -- Indication: For WHEEZING    famotidine 20 mg oral tablet  -- 1 tab(s) by mouth once a day (at bedtime)  -- Indication: For acid reflex    benzocaine-menthol 15 mg-3.6 mg mucous membrane lozenge  -- 1 tab(s) mucous membrane every 8 hours  -- Indication: For sore throat    lactobacillus acidophilus oral capsule  -- 1 cap(s) by mouth 2 times a day   -- Indication: For Gastroenterological maintenance    pantoprazole 40 mg oral delayed release tablet  -- 1 tab(s) by mouth once a day (before a meal)  -- Indication: For Gastroenterological maintenance

## 2019-03-01 NOTE — DISCHARGE NOTE ADULT - MEDICATION SUMMARY - MEDICATIONS TO STOP TAKING
I will STOP taking the medications listed below when I get home from the hospital:    Tylenol 325 mg oral capsule    guaiFENesin 100 mg/5 mL oral liquid  -- 10 milliliter(s) by mouth every 6 hours, As needed, Cough    predniSONE 10 mg oral tablet  -- 4 tab(s) by mouth once a day     4 tabs x 3 days  3 tabs x 3 days  2 tabs x 3 days  1 tab x 3 days  -- It is very important that you take or use this exactly as directed.  Do not skip doses or discontinue unless directed by your doctor.  Obtain medical advice before taking any non-prescription drugs as some may affect the action of this medication.  Take with food or milk.    budesonide-formoterol 160 mcg-4.5 mcg/inh inhalation aerosol  -- 2 puff(s) inhaled 2 times a day    fluticasone 50 mcg/inh nasal spray  -- 1 spray(s) into nose 2 times a day    ipratropium-albuterol 0.5 mg-2.5 mg/3 mLinhalation solution  -- 3 milliliter(s) by nebulizer every 6 hours   -- For inhalation only.  It is very important that you take or use this exactly as directed.  Do not skip doses or discontinue unless directed by your doctor.  Obtain medical advice before taking any non-prescription drugs as some may affect the action of this medication.

## 2019-03-01 NOTE — PROGRESS NOTE ADULT - SUBJECTIVE AND OBJECTIVE BOX
Patient is a 23y old  Male who presented with a chief complaint of diarrhea, rectal bleeding (27 Feb 2019 14:14)      INTERVAL HPI/OVERNIGHT EVENTS:  tolerated prep  stools clear light yellow  no rectal bleeding    awaiting EGD and Colonoscopy today    MEDICATIONS  (STANDING):  heparin  Injectable 5000 Unit(s) SubCutaneous every 12 hours  influenza   Vaccine 0.5 milliLiter(s) IntraMuscular once  lactobacillus acidophilus 1 Tablet(s) Oral three times a day with meals  pantoprazole    Tablet 40 milliGRAM(s) Oral before breakfast  piperacillin/tazobactam IVPB. 3.375 Gram(s) IV Intermittent every 8 hours  sodium chloride 0.9%. 1000 milliLiter(s) (80 mL/Hr) IV Continuous <Continuous>    MEDICATIONS  (PRN):  ondansetron Injectable 4 milliGRAM(s) IV Push every 6 hours PRN Nausea and/or Vomiting      Allergies  No Known Allergies      Review of Systems:  General:  +wt loss, fevers (none since admission) , No  night sweats   CV:  No pain, palpitations, hypo/hypertension  Resp:  No dyspnea, cough, tachypnea, wheezing  :  No pain, bleeding, incontinence, nocturia  Muscle: +sacroiliitis  Neuro:  No weakness, tingling, memory problems  Psych:  No fatigue, insomnia, mood problems, depression  Endocrine:  No polyuria, polydypsia, cold/heat intolerance  Heme:  No petechiae, ecchymosis, easy bruisability  Skin:  No rash, tattoos, scars, edema    Vital Signs Last 24 Hrs  T(C): 37 (28 Feb 2019 05:50), Max: 37.3 (27 Feb 2019 20:14)  T(F): 98.6 (28 Feb 2019 05:50), Max: 99.2 (27 Feb 2019 20:14)  HR: 86 (28 Feb 2019 05:50) (60 - 86)  BP: 118/74 (28 Feb 2019 05:50) (118/74 - 119/74)  BP(mean): --  RR: 17 (28 Feb 2019 05:50) (17 - 18)  SpO2: 98% (28 Feb 2019 05:50) (98% - 98%)    PHYSICAL EXAM:  Constitutional: NAD, non toxic appearing, well-developed pleasant WM sitting up in bed. family at bedside  Neck: No LAD, supple no JVD  Respiratory: grossly clear  Cardiovascular: S1 and S2, RRR, no M  Gastrointestinal: BS+, obese soft, ND +epigastric and mid-lower abdominal tenderness without rebound, guarding or rigidity neg HSM  Extremities: No peripheral edema, neg clubbing, cyanosis  NO palpable nodules on shins anteriorly  Vascular: 2+ peripheral pulses  Neurological: A/O x 3, no focal deficits  Psychiatric: Normal mood, normal affect  Skin: No rashes, no rash. no nail pitting    LABS:                        12.2   15.6  )-----------( 653      ( 26 Feb 2019 18:39 )             36.3     02-27    139  |  102  |  7   ----------------------------<  95  3.5   |  22  |  0.90    Ca    9.0      27 Feb 2019 10:37  Phos  3.9     02-27  Mg     2.3     02-27    TPro  8.7<H>  /  Alb  4.1  /  TBili  0.3  /  DBili  x   /  AST  16  /  ALT  20  /  AlkPhos  75  02-26      RADIOLOGY & ADDITIONAL TESTS:
INTERVAL HPI/OVERNIGHT EVENTS:  Pt seen and examined at bedside.     Allergies/Intolerance: No Known Allergies      MEDICATIONS  (STANDING):  heparin  Injectable 5000 Unit(s) SubCutaneous every 12 hours  influenza   Vaccine 0.5 milliLiter(s) IntraMuscular once  pantoprazole    Tablet 40 milliGRAM(s) Oral before breakfast  piperacillin/tazobactam IVPB. 3.375 Gram(s) IV Intermittent every 8 hours  sodium chloride 0.9%. 1000 milliLiter(s) (80 mL/Hr) IV Continuous <Continuous>    MEDICATIONS  (PRN):  ondansetron Injectable 4 milliGRAM(s) IV Push every 6 hours PRN Nausea and/or Vomiting        ROS: all systems reviewed and wnl      PHYSICAL EXAMINATION:  Vital Signs Last 24 Hrs  T(C): 36.8 (27 Feb 2019 05:09), Max: 37 (26 Feb 2019 21:28)  T(F): 98.2 (27 Feb 2019 05:09), Max: 98.6 (26 Feb 2019 21:28)  HR: 80 (27 Feb 2019 05:09) (74 - 105)  BP: 110/70 (27 Feb 2019 05:09) (110/70 - 139/77)  BP(mean): --  RR: 18 (27 Feb 2019 05:09) (16 - 18)  SpO2: 97% (27 Feb 2019 05:09) (97% - 100%)  CAPILLARY BLOOD GLUCOSE          02-26 @ 07:01  -  02-27 @ 07:00  --------------------------------------------------------  IN: 900 mL / OUT: 0 mL / NET: 900 mL        GENERAL:   NECK: supple, No JVD  CHEST/LUNG: clear to auscultation bilaterally; no rales, rhonchi, or wheezing b/l  HEART: normal S1, S2  ABDOMEN: BS+, soft, ND, NT   EXTREMITIES:  pulses palpable; no clubbing, cyanosis, or edema b/l LEs  SKIN: no rashes or lesions      LABS:                        12.2   15.6  )-----------( 653      ( 26 Feb 2019 18:39 )             36.3     02-26    139  |  98  |  9   ----------------------------<  107<H>  3.3<L>   |  24  |  0.91    Ca    9.8      26 Feb 2019 18:39    TPro  8.7<H>  /  Alb  4.1  /  TBili  0.3  /  DBili  x   /  AST  16  /  ALT  20  /  AlkPhos  75  02-26      Urinalysis Basic - ( 26 Feb 2019 20:25 )    Color: Light Yellow / Appearance: Clear / SG: >1.050 / pH: x  Gluc: x / Ketone: Trace  / Bili: Negative / Urobili: Negative   Blood: x / Protein: 30 mg/dL / Nitrite: Negative   Leuk Esterase: Negative / RBC: 2 /hpf / WBC 5 /HPF   Sq Epi: x / Non Sq Epi: 3 /hpf / Bacteria: Negative
Male  Patient is a 23y old  Male who presents with a chief complaint of diarrhea, rectal bleeding (2019 14:14)      HPI:  High Risk Travel:  International Travel? No(1)    	        23 yr old,   h/o  gastritis from   nsaid  use,  c/c  back pain  p/w worsening diarrhea over the past 3 days 15-20 episodes daily intermittently bloody. associated with  fevers (tmax 102) and bilious vomiting (green) and epigastric abdominal pain. 15 lb weight loss in the last 2 weeks. sent  by his  gi  dr/ c diff, was  testes as  an op,a nd  was  negative   currently in peds unit (2019 18:49)    MEDICINE ATTENDING    Assuming care of my office pt.  Chart reviewed.  Pt seen and examined.  Prepping for panedoscopy today.  recently worked up for arthitic complaints. taking NSAIDS. Developed n/v, but also fever with bloody diarrhea.  CT shows desending colitis. currently on Zosyn, afebrile, Tmax 99.2F.  Note leukocytosis.  appreciate GI eval and f/u  Hep B and quantiferon testing in progress.          Vital Signs Last 24 Hrs  T(C): 37 (2019 05:50), Max: 37.3 (2019 20:14)  T(F): 98.6 (2019 05:50), Max: 99.2 (2019 20:14)  HR: 86 (2019 05:50) (60 - 86)  BP: 118/74 (2019 05:50) (113/70 - 119/74)  BP(mean): --  RR: 17 (2019 05:50) (17 - 18)  SpO2: 98% (2019 05:50) (97% - 98%)  Daily     Daily Weight in k (2019 08:45)     @ 07:01  -   @ 07:00  --------------------------------------------------------  IN: 120 mL / OUT: 0 mL / NET: 120 mL        PHYSICAL EXAM:      Constitutional: No chills, non toxic appearing    Eyes: anicteric    Respiratory:  lungs clear    Cardiovascular: RRR nl S1S2, no M/ rub    Gastrointestinal:Soft, diffusely tender, no mass, guarding      Extremities: No c/c/e    Neurological:  No global or focal deficits    Skin:Warm and dry    Musculoskeletal:  No synovitis                                    12.2   15.6  )-----------( 653      ( 2019 18:39 )             36.3         139  |  102  |  7   ----------------------------<  95  3.5   |  22  |  0.90    Ca    9.0      2019 10:37  Phos  3.9       Mg     2.3         TPro  8.7<H>  /  Alb  4.1  /  TBili  0.3  /  DBili  x   /  AST  16  /  ALT  20  /  AlkPhos  75            MEDICATIONS  (STANDING):  heparin  Injectable 5000 Unit(s) SubCutaneous every 12 hours  influenza   Vaccine 0.5 milliLiter(s) IntraMuscular once  lactobacillus acidophilus 1 Tablet(s) Oral three times a day with meals  pantoprazole    Tablet 40 milliGRAM(s) Oral before breakfast  piperacillin/tazobactam IVPB. 3.375 Gram(s) IV Intermittent every 8 hours  sodium chloride 0.9%. 1000 milliLiter(s) (80 mL/Hr) IV Continuous <Continuous>    MEDICATIONS  (PRN):  ondansetron Injectable 4 milliGRAM(s) IV Push every 6 hours PRN Nausea and/or Vomiting      Impression  23M bloody diarrhea  CT s/o UC.  Likely NSAID induced gastritis    Plan  Pan endoscopy today  VTE prophylaxis  f/u HepB/ QF tarun August MD  [phone]
Patient is a 23y old  Male who presented with a chief complaint of diarrhea, rectal bleeding (27 Feb 2019 14:14)      INTERVAL HPI/OVERNIGHT EVENTS:  no abdominal pain  no BM yesterday after EGD and Colonoscopy, 1 loose stool this morning, no bleeding    No fever or chills  still with low back pain  no nausea or vomiting  some dyspepsia  tolerating PO diet (2 meals yesterday - LRD)    MEDICATIONS  (STANDING):  heparin  Injectable 5000 Unit(s) SubCutaneous every 12 hours  influenza   Vaccine 0.5 milliLiter(s) IntraMuscular once  lactobacillus acidophilus 1 Tablet(s) Oral three times a day with meals  mesalamine DR Capsule 800 milliGRAM(s) Oral three times a day  mesalamine Enema 4 Gram(s) Rectal at bedtime  pantoprazole    Tablet 40 milliGRAM(s) Oral before breakfast    MEDICATIONS  (PRN):  ondansetron Injectable 4 milliGRAM(s) IV Push every 6 hours PRN Nausea and/or Vomiting      Allergies  No Known Allergies    Review of Systems:  General:  +wt loss, fevers (none since admission) , No  night sweats   CV:  No pain, palpitations, hypo/hypertension  Resp:  No dyspnea, cough, tachypnea, wheezing  :  No pain, bleeding, incontinence, nocturia  Muscle: +sacroiliitis  Neuro:  No weakness, tingling, memory problems  Psych:  No fatigue, insomnia, mood problems, depression  Endocrine:  No polyuria, polydypsia, cold/heat intolerance  Heme:  No petechiae, ecchymosis, easy bruisability  Skin:  No rash, tattoos, scars, edema    Vital Signs Last 24 Hrs  T(C): 36.8 (01 Mar 2019 05:55), Max: 37.5 (28 Feb 2019 20:02)  T(F): 98.2 (01 Mar 2019 05:55), Max: 99.5 (28 Feb 2019 20:02)  HR: 87 (01 Mar 2019 05:55) (70 - 87)  BP: 120/77 (01 Mar 2019 05:55) (114/75 - 128/76)  BP(mean): --  RR: 17 (01 Mar 2019 05:55) (17 - 18)  SpO2: 97% (01 Mar 2019 05:55) (96% - 99%)    PHYSICAL EXAM:  Constitutional: NAD, non toxic appearing, well-developed pleasant WM sitting up in bed. family at bedside  Neck: No LAD, supple no JVD  Respiratory: grossly clear  Cardiovascular: S1 and S2, RRR, no M  Gastrointestinal: BS+, obese soft, ND +mild mid-lower abdominal tenderness without rebound, guarding or rigidity neg HSM  Extremities: No peripheral edema, neg clubbing, cyanosis  NO palpable nodules on shins anteriorly  Vascular: 2+ peripheral pulses  Neurological: A/O x 3, no focal deficits  Psychiatric: Normal mood, normal affect  Skin: No rashes, no rash. no nail pitting    LABS:    02-27    139  |  102  |  7   ----------------------------<  95  3.5   |  22  |  0.90    Ca    9.0      27 Feb 2019 10:37  Phos  3.9     02-27  Mg     2.3     02-27      RADIOLOGY & ADDITIONAL TESTS:  < from: Upper Endoscopy (02.28.19 @ 14:38) >  Findings:       The examined esophagus was normal.       Esophagogastric landmarks were identified: the Z-line was found at 38 cm from the incisors.       Diffuse moderate inflammation characterized by erosions and erythema was found in the gastric        antrum. Biopsies were taken with a cold forceps for histology.       Patchy mildly erythematous mucosa without bleeding was found in the gastric body. Biopsies        were taken with a cold forceps for histology.       Patchy mildly erythematous mucosa without active bleeding and with no stigmata of bleeding       was found in the first part of the duodenum.       The 2nd part of the duodenum was normal. Biopsies for histology were taken with a cold        forceps for evaluation of celiac disease.                                             Impression:          - Normal esophagus.                       - Esophagogastric landmarks identified.                       - Gastritis. Biopsied.                       - Erythematous mucosa in the gastric body. Biopsied.                       - Erythematous duodenopathy.                       - Normal 2nd part of the duodenum. Biopsied.  Recommendation:      - Await pathology results.                       - Use Prilosec (omeprazole) 40 mg PO daily daily.                       - No aspirin, ibuprofen, naproxen, or other non-steroidal anti-inflammatory                        drugs.                                                                                                          ____________________  Neo Alvarez MD  2/28/2019 3:57:23 PM    < from: Colonoscopy (02.28.19 @ 13:04) >  Findings:       An area of moderately congested mucosa was found in the rectum, in the sigmoid colon, in the        descending colon and in the transverse colon. Biopsies were taken with a cold forceps for        histology.       A diffuse area of moderately erythematous mucosa was found in the rectum.       A diffuse area of granular mucosa was found in the rectum, in the sigmoid colon, in the        descending colon and in the transverse colon. Biopsies were taken with a cold forceps for        histology.       A continuous area of nonbleeding ulcerated mucosa,linea with no stigmata of recent bleeding        was present in the rectum, in the sigmoid colon, in the descending colon and in the     transverse colon. Biopsies were taken with a cold forceps for histology.       The terminal ileum appeared normal. Biopsies were taken with a cold forceps for histology.       The cecum appeared normal. Biopsies were taken with a cold forceps for histology.       The ascending colon appeared normal. Biopsies were taken with a cold forceps for histology.                                                                                                        Impression:          - Diffuse colitis ,moderate from rectum to distal transverse colon, rule out                        IBD  Recommendation:      - Await pathology results.                       - No aspirin, ibuprofen, naproxen, or other non-steroidal anti-inflammatory      drugs.                       - Use Asacol  mg 1 tab PO TID daily.                       - rowasa enema daily                                                                                                        Attending Participation:   I personally performed the entire procedure.                                                                                                          ____________________  Neo Alvarez MD  2/28/2019 3:48:55 PM
Sleeping quietly, family at bedside.  EGD- gastritis  Colon diffuse left sided colitis  Now on Rowasa and Asacol, with opmeprazole.    Vital Signs Last 24 Hrs  T(C): 36.8 (01 Mar 2019 05:55), Max: 37.5 (28 Feb 2019 20:02)  T(F): 98.2 (01 Mar 2019 05:55), Max: 99.5 (28 Feb 2019 20:02)  HR: 87 (01 Mar 2019 05:55) (70 - 87)  BP: 120/77 (01 Mar 2019 05:55) (114/75 - 128/76)  BP(mean): --  RR: 17 (01 Mar 2019 05:55) (17 - 18)  SpO2: 97% (01 Mar 2019 05:55) (96% - 99%)  Daily Height in cm: 172.72 (28 Feb 2019 15:25)    Daily     02-28 @ 07:01  -  03-01 @ 07:00  --------------------------------------------------------  IN: 1642 mL / OUT: 0 mL / NET: 1642 mL        PHYSICAL EXAM:      Constitutional: No chills, non toxic appearing    Eyes: anicteric    Respiratory:  lungs clear    Cardiovascular: RRR nl S1S2, no M/ rub    Gastrointestinal:Soft, diffusely tender, no mass, guarding      Extremities: No c/c/e    Neurological:  No global or focal deficits    Skin:Warm and dry    Musculoskeletal:  No synovitis          02-27    139  |  102  |  7   ----------------------------<  95  3.5   |  22  |  0.90    Ca    9.0      27 Feb 2019 10:37  Phos  3.9     02-27  Mg     2.3     02-27            MEDICATIONS  (STANDING):  heparin  Injectable 5000 Unit(s) SubCutaneous every 12 hours  influenza   Vaccine 0.5 milliLiter(s) IntraMuscular once  lactobacillus acidophilus 1 Tablet(s) Oral three times a day with meals  mesalamine DR Capsule 800 milliGRAM(s) Oral three times a day  mesalamine Enema 4 Gram(s) Rectal at bedtime  pantoprazole    Tablet 40 milliGRAM(s) Oral before breakfast  sodium chloride 0.9%. 1000 milliLiter(s) (80 mL/Hr) IV Continuous <Continuous>    MEDICATIONS  (PRN):  ondansetron Injectable 4 milliGRAM(s) IV Push every 6 hours PRN Nausea and/or Vomiting  < from: Upper Endoscopy (02.28.19 @ 14:38) >  [facility]  ____________________________________________________________________________________________________  Patient Name: Joesph Diaz                   MRN: [id number]  Account Number: [id number]                     YOB: 1995  Room: Endoscopy Room 4                           Gender: Male  Attending MD: Neo Alvarez MD              Procedure Date No Time: 2/28/2019  ____________________________________________________________________________________________________     Procedure:           Upper GI endoscopy  Indications:         Dyspepsia  Providers:           Neo Alvarez MD  Medicines:           Monitored Anesthesia Care  Complications:       No immediate complications. Estimated blood loss: Minimal.  ____________________________________________________________________________________________________  Procedure:           After obtaining informed consent, the endoscope was passed under direct                        vision. Throughout the procedure, the patient's blood pressure, pulse, and                        oxygen saturations were monitored continuously. The Endoscope was introduced                        through the mouth, and advanced to the second part of duodenum. The upper GI                        endoscopy was accomplished without difficulty.                                                                                                        Findings:       The examined esophagus was normal.       Esophagogastric landmarks were identified: the Z-line was found at 38 cm from the incisors.       Diffuse moderate inflammation characterized by erosions and erythema was found in the gastric        antrum. Biopsies were taken with a cold forceps for histology.       Patchy mildly erythematous mucosa without bleeding was found in the gastric body. Biopsies        were taken with a cold forceps for histology.       Patchy mildly erythematous mucosa without active bleeding and with no stigmata of bleeding       was found in the first part of the duodenum.       The 2nd part of the duodenum was normal. Biopsies for histology were taken with a cold        forceps for evaluation of celiac disease.                                             Impression:          - Normal esophagus.                       - Esophagogastric landmarks identified.                       - Gastritis. Biopsied.                       - Erythematous mucosa in the gastric body. Biopsied.                       - Erythematous duodenopathy.                       - Normal 2nd part of the duodenum. Biopsied.  Recommendation:      - Await pathology results.                       - Use Prilosec (omeprazole) 40 mg PO daily daily.                       - No aspirin, ibuprofen, naproxen, or other non-steroidal anti-inflammatory                        drugs.    < end of copied text >            < from: Colonoscopy (02.28.19 @ 13:04) >    < from: Colonoscopy (02.28.19 @ 13:04) >  [facility]  ____________________________________________________________________________________________________  Patient Name: Joesph Diaz                   MRN: [id number]  Account Number: [id number]                     YOB: 1995  Room: Endoscopy Room 4                           Gender: Male  Attending MD: Neo Alvarez MD              Procedure Date No Time: 2/28/2019  ____________________________________________________________________________________________________     Procedure:           Colonoscopy  Indications:         Chronic diarrhea, Hematochezia, Abnormal CT of the GI tract  Providers:           Neo Alvarez MD  Medicines:           Monitored Anesthesia Care  Complications:   No immediate complications.  ____________________________________________________________________________________________________  Procedure:           Pre-Anesthesia Assessment:                       - The risks and benefits of the procedure and the sedation options and risks                        were discussed with the patient. All questions were answered and informed                        consent was obtained.                       After I obtained informed consent, the scope was passed under direct vision.                        Throughout the procedure, the patient's blood pressure, pulse, and oxygen                        saturations were monitored continuously. The Colonoscope was introduced                        through the anus and advanced to the terminal ileum, with identification of                        the appendiceal orifice and IC valve. The colonoscopy was performed without                        difficulty.                                                Findings:       An area of moderately congested mucosa was found in the rectum, in the sigmoid colon, in the        descending colon and in the transverse colon. Biopsies were taken with a cold forceps for        histology.       A diffuse area of moderately erythematous mucosa was found in the rectum.       A diffuse area of granular mucosa was found in the rectum, in the sigmoid colon, in the        descending colon and in the transverse colon. Biopsies were taken with a cold forceps for        histology.       A continuous area of nonbleeding ulcerated mucosa,linea with no stigmata of recent bleeding        was present in the rectum, in the sigmoid colon, in the descending colon and in the     transverse colon. Biopsies were taken with a cold forceps for histology.       The terminal ileum appeared normal. Biopsies were taken with a cold forceps for histology.       The cecum appeared normal. Biopsies were taken with a cold forceps for histology.       The ascending colon appeared normal. Biopsies were taken with a cold forceps for histology.                                                                                                        Impression:          - Diffuse colitis ,moderate from rectum to distal transverse colon, rule out                        IBD  Recommendation:      - Await pathology results.                       - No aspirin, ibuprofen, naproxen, or other non-steroidal anti-inflammatory      drugs.                       - Use Asacol  mg 1 tab PO TID daily.                       - rowasa enema daily                                                                                                        Attending Participation:   I personally performed the entire procedure.    < end of copied text >  < from: Colonoscopy (02.28.19 @ 13:04) >  [facility]  ____________________________________________________________________________________________________  Patient Name: Joesph Diaz                   MRN: [id number]  Account Number: [id number]                     YOB: 1995  Room: Endoscopy Room 4                           Gender: Male  Attending MD: Neo Alvarez MD              Procedure Date No Time: 2/28/2019  ____________________________________________________________________________________________________     Procedure:           Colonoscopy  Indications:         Chronic diarrhea, Hematochezia, Abnormal CT of the GI tract  Providers:           Neo Alvarez MD  Medicines:           Monitored Anesthesia Care  Complications:   No immediate complications.  ____________________________________________________________________________________________________  Procedure:           Pre-Anesthesia Assessment:                       - The risks and benefits of the procedure and the sedation options and risks                        were discussed with the patient. All questions were answered and informed                        consent was obtained.                       After I obtained informed consent, the scope was passed under direct vision.                        Throughout the procedure, the patient's blood pressure, pulse, and oxygen                        saturations were monitored continuously. The Colonoscope was introduced                        through the anus and advanced to the terminal ileum, with identification of                        the appendiceal orifice and IC valve. The colonoscopy was performed without                        difficulty.                                                Findings:       An area of moderately congested mucosa was found in the rectum, in the sigmoid colon, in the        descending colon and in the transverse colon. Biopsies were taken with a cold forceps for        histology.       A diffuse area of moderately erythematous mucosa was found in the rectum.       A diffuse area of granular mucosa was found in the rectum, in the sigmoid colon, in the        descending colon and in the transverse colon. Biopsies were taken with a cold forceps for        histology.       A continuous area of nonbleeding ulcerated mucosa,linea with no stigmata of recent bleeding        was present in the rectum, in the sigmoid colon, in the descending colon and in the     transverse colon. Biopsies were taken with a cold forceps for histology.       The terminal ileum appeared normal. Biopsies were taken with a cold forceps for histology.       The cecum appeared normal. Biopsies were taken with a cold forceps for histology.       The ascending colon appeared normal. Biopsies were taken with a cold forceps for histology.                                                                                                        Impression:          - Diffuse colitis ,moderate from rectum to distal transverse colon, rule out                        IBD  Recommendation:      - Await pathology results.                       - No aspirin, ibuprofen, naproxen, or other non-steroidal anti-inflammatory      drugs.                       - Use Asacol  mg 1 tab PO TID daily.                       - rowasa enema daily                                                                                                        Attending Participation:   I personally performed the entire procedure.    < end of copied text >    < end of copied text >          Impression  23M bloody diarrhea  CT s/o UC.  Likely NSAID induced gastritis/ IBD    Plan  Omeprazole, Rowasa enema, Asacol  GI f/u, D/C  plan  VTE prophylaxis  f/u HepB/ QF tarun August MD  [phone]

## 2019-03-01 NOTE — DISCHARGE NOTE ADULT - ADDITIONAL INSTRUCTIONS
follow up with Gastroenterologist on 3/12/19  -Results of outpt.  Hep B serologies and Quant. Gold serologies (in case pt may require biologic Rx in future for treatment) being sent to office by pt (had drawn at outpt lab from [place])  f/u Rheumatology as outpt, no NSAIDS.  Has scheduled Rheum. appt on 3/12/19 per pt/family

## 2019-03-04 LAB
CULTURE RESULTS: SIGNIFICANT CHANGE UP
CULTURE RESULTS: SIGNIFICANT CHANGE UP
SPECIMEN SOURCE: SIGNIFICANT CHANGE UP
SPECIMEN SOURCE: SIGNIFICANT CHANGE UP
SURGICAL PATHOLOGY STUDY: SIGNIFICANT CHANGE UP

## 2019-03-12 ENCOUNTER — TRANSCRIPTION ENCOUNTER (OUTPATIENT)
Age: 24
End: 2019-03-12

## 2019-11-25 NOTE — ED PROVIDER NOTE - CAS EDP CONSULT WILL SEE PT
Called number on file: that number belongs to the Pt's Stepfather. Did NOT disclose where I was calling from, but did ask for the best number to get a hold of patient;       Provided this number: [phone]    Called Pt:     No answer, left VM to return call to clinic.     Leilani HENDRICKS RN    Will also confirm telephone number on callback      Notes recorded by Josue Stephens MD on 11/24/2019 at 6:30 PM CST    Please notify patient with the following testicular ultrasound result  Surya,   Ultrasound of the testicles shows normal testicular size, there is mention of left-sided varicocele which are dilated veins.  This sometimes can cause pain.  There is also mention of bilateral epididymal head cysts usually are benign.  We can refer to urology if you approve to address the left-sided varicocele.  Dr. Stephens   shortly

## 2021-06-30 NOTE — PROGRESS NOTE ADULT - SUBJECTIVE AND OBJECTIVE BOX
INTERVAL HPI/OVERNIGHT EVENTS: Tolerating Po.  No BM    MEDICATIONS  (STANDING):  acetaminophen   Tablet 500 milliGRAM(s) Oral every 8 hours PRN  acetaminophen   Tablet. 650 milliGRAM(s) Oral every 6 hours PRN  ALBUTerol    0.083% 2.5 milliGRAM(s) Nebulizer every 4 hours  ALBUTerol    0.083% 2.5 milliGRAM(s) Nebulizer every 2 hours PRN  azithromycin  IVPB 500 milliGRAM(s) IV Intermittent every 24 hours  buDESOnide 160 MICROgram(s)/formoterol 4.5 MICROgram(s) Inhaler 2 Puff(s) Inhalation two times a day  cefTRIAXone   IVPB 1 Gram(s) IV Intermittent every 24 hours  enoxaparin Injectable 40 milliGRAM(s) SubCutaneous every 24 hours  fluticasone propionate 50 MICROgram(s)/spray Nasal Spray 1 Spray(s) Both Nostrils two times a day  guaiFENesin    Syrup 200 milliGRAM(s) Oral every 6 hours PRN  ibuprofen  Tablet 600 milliGRAM(s) Oral every 8 hours PRN  ondansetron Injectable 4 milliGRAM(s) IV Push every 6 hours PRN  pantoprazole    Tablet 40 milliGRAM(s) Oral two times a day  polyethylene glycol 3350 17 Gram(s) Oral daily  predniSONE   Tablet 40 milliGRAM(s) Oral daily  sucralfate suspension 1 Gram(s) Oral four times a day      T(C): 36.8 (08-13-18 @ 08:01), Max: 37.4 (08-12-18 @ 16:18)  HR: 62 (08-13-18 @ 08:01) (62 - 71)  BP: 130/79 (08-13-18 @ 08:01) (114/64 - 130/79)  RR: 18 (08-13-18 @ 08:01) (18 - 19)  SpO2: 97% (08-13-18 @ 08:01) (95% - 97%)  Wt(kg): --    I&O's Summary    12 Aug 2018 07:01  -  13 Aug 2018 07:00  --------------------------------------------------------  IN: 480 mL / OUT: 0 mL / NET: 480 mL        Intolerances        Review of Systems:    General:  No wt loss, fevers, chills  ENT:  No sore throat, pain, runny nose, dysphagia  CV:  No pain, hypo/hypertension  Resp:  No dyspnea, cough, tachypnea, wheezing  Neuro:  No weakness, tingling, memory problems  Heme:  No petechiae, ecchymosis, easy bruisability    PHYSICAL EXAM:    Constitutional: NAD, well-developed  Neck: No LAD, supple  Respiratory: clear to auscultation b/l no rales, rhonchi, wheezing  Cardiovascular: S1 and S2, RRR, no murmur  Gastrointestinal: +BS x4, soft, NT/ND, neg HSM,  Extremities: No peripheral edema, neg clubbing, cyanosis  Vascular: 2+ peripheral pulses  Neurological: A/O x 3, no focal deficits  Psychiatric: Normal mood, normal affect  Skin: No rashes normal/normal shape/ROM intact/strength intact

## 2023-07-27 NOTE — ED PROVIDER NOTE - CLINICAL SUMMARY MEDICAL DECISION MAKING FREE TEXT BOX
(4) no limitation MD Gila,Attending: pt seen. Agree with above HPI/ROS/PE. 5-6 days of worsening sxs of nausea /vom/diarrhea at times with blood, abdominal pain, fever. weight loss ? 15 pounds over past 2-3 weeks. being evaluated for possible inflammatory bowel disease and due for upper and lower endoscopy this FRiday. Abdomen benign however any palpation induces vomiting. For full labs/CT abdomen and admission to [facility].

## 2025-04-10 NOTE — PROGRESS NOTE ADULT - ASSESSMENT
CAP  GERD with ? levaquin induced nausea and vomiting  Wheezing, now on steroids.  I think antibiotic course should be adequate.  Favor d/c of antibiotics after todays doses if okay with pulmonary  steroids per pulmonary